# Patient Record
Sex: MALE | Race: WHITE | NOT HISPANIC OR LATINO | Employment: OTHER | URBAN - METROPOLITAN AREA
[De-identification: names, ages, dates, MRNs, and addresses within clinical notes are randomized per-mention and may not be internally consistent; named-entity substitution may affect disease eponyms.]

---

## 2017-02-03 ENCOUNTER — OFFICE VISIT (OUTPATIENT)
Dept: RHEUMATOLOGY | Facility: PHYSICIAN GROUP | Age: 72
End: 2017-02-03
Payer: MEDICARE

## 2017-02-03 VITALS
SYSTOLIC BLOOD PRESSURE: 142 MMHG | TEMPERATURE: 98.1 F | OXYGEN SATURATION: 94 % | RESPIRATION RATE: 12 BRPM | DIASTOLIC BLOOD PRESSURE: 80 MMHG | WEIGHT: 256 LBS | HEART RATE: 79 BPM | BODY MASS INDEX: 35.84 KG/M2

## 2017-02-03 DIAGNOSIS — M15.9 PRIMARY OSTEOARTHRITIS INVOLVING MULTIPLE JOINTS: ICD-10-CM

## 2017-02-03 DIAGNOSIS — G47.30 SLEEP APNEA, UNSPECIFIED TYPE: ICD-10-CM

## 2017-02-03 DIAGNOSIS — M1A.09X0 IDIOPATHIC CHRONIC GOUT OF MULTIPLE SITES WITHOUT TOPHUS: ICD-10-CM

## 2017-02-03 DIAGNOSIS — K51.90 ULCERATIVE COLITIS WITHOUT COMPLICATIONS, UNSPECIFIED LOCATION (HCC): ICD-10-CM

## 2017-02-03 PROCEDURE — G8419 CALC BMI OUT NRM PARAM NOF/U: HCPCS | Performed by: INTERNAL MEDICINE

## 2017-02-03 PROCEDURE — 4004F PT TOBACCO SCREEN RCVD TLK: CPT | Mod: 8P | Performed by: INTERNAL MEDICINE

## 2017-02-03 PROCEDURE — G8432 DEP SCR NOT DOC, RNG: HCPCS | Performed by: INTERNAL MEDICINE

## 2017-02-03 PROCEDURE — 4040F PNEUMOC VAC/ADMIN/RCVD: CPT | Mod: 8P | Performed by: INTERNAL MEDICINE

## 2017-02-03 PROCEDURE — 3017F COLORECTAL CA SCREEN DOC REV: CPT | Mod: 8P | Performed by: INTERNAL MEDICINE

## 2017-02-03 PROCEDURE — G8484 FLU IMMUNIZE NO ADMIN: HCPCS | Performed by: INTERNAL MEDICINE

## 2017-02-03 PROCEDURE — 99214 OFFICE O/P EST MOD 30 MIN: CPT | Mod: 25 | Performed by: INTERNAL MEDICINE

## 2017-02-03 PROCEDURE — 1101F PT FALLS ASSESS-DOCD LE1/YR: CPT | Mod: 8P | Performed by: INTERNAL MEDICINE

## 2017-02-03 PROCEDURE — 20600 DRAIN/INJ JOINT/BURSA W/O US: CPT | Performed by: INTERNAL MEDICINE

## 2017-02-03 RX ORDER — ALLOPURINOL 300 MG/1
TABLET ORAL
Qty: 90 TAB | Refills: 1 | Status: SHIPPED | OUTPATIENT
Start: 2017-02-03 | End: 2017-09-14

## 2017-02-03 RX ORDER — METHYLPREDNISOLONE ACETATE 40 MG/ML
40 INJECTION, SUSPENSION INTRA-ARTICULAR; INTRALESIONAL; INTRAMUSCULAR; SOFT TISSUE ONCE
Status: COMPLETED | OUTPATIENT
Start: 2017-02-03 | End: 2017-02-03

## 2017-02-03 RX ADMIN — METHYLPREDNISOLONE ACETATE 10 MG: 40 INJECTION, SUSPENSION INTRA-ARTICULAR; INTRALESIONAL; INTRAMUSCULAR; SOFT TISSUE at 08:46

## 2017-02-03 NOTE — PROGRESS NOTES
Chief Complaint- joint pain    Subjective:   hCristopher Ho is a 71 y.o. male here today for follow up of rheumatological issues    This is a [facility] visit [facility]  [facility] and DJD, djd started about 10 years, pt also with gout, last big flare 6 years ago got cortisone injections, patient hasn't had a gout flare since last visit, patient currently on allopurinol at 300 mg by mouth daily, and states he is generally quite well. Since last visit, patient has not had any gout flare but does complain of pain in his right MCP joint. In the remote past, we had done a right index MCP joint cortisone injection with good results, patient wonders about getting that again today. Pt also with DM, no thyroid problems  No skin conditions, no anemia, no DVT/PE, no iritis/uveitis, no chronic recurrent infections, n organ problems  Pt takes aleve qday  Pt with sleep apnea, on CPAP  Pt with ulcerative colitis, pt takes Apriso and states that the UC is under very good control, denies achilles tendon inflammation    Uric acid 6.0 8/2013 [facility]  Uric acid 8.4 elevated 3/2016  RF neg 3/2016  CCP neg 3/2016  SI joints 3/2016- normal   DEXA 4/2016 T scores 2.3, 0.1      Current medicines (including changes today)  Current Outpatient Prescriptions   Medication Sig Dispense Refill   • allopurinol (ZYLOPRIM) 300 MG Tab 1 tab po qday 90 Tab 1   • meloxicam (MOBIC) 15 MG tablet 1 tab po qday for joint pain 30 Tab 0   • atorvastatin (LIPITOR) 10 MG Tab Take 10 mg by mouth every evening.     • metformin (GLUCOPHAGE) 500 MG Tab Take 500 mg by mouth 2 times a day, with meals.     • Mesalamine 0.375 GM CAPSULE SR 24 HR Take  by mouth.     • zolpidem (AMBIEN) 5 MG Tab Take 5 mg by mouth at bedtime as needed for Sleep.     • Indomethacin (INDOCIN PO) Take  by mouth.       No current facility-administered medications for this visit.     He  has no past medical history on file.    ROS   Other than what is mentioned in HPI or  physical exam, there is no history of headaches, double vision or blurred vision. No temporal tenderness or jaw claudication. No history of cataracts or glaucoma. No trouble swallowing difficulties or sore throats. No history of thyroid disease. No chest complaints including chest pain, cough or sputum production. No shortness of breath. No GI complaints including nausea, vomiting, change in bowel habits, or past peptic ulcer disease. No history of blood in the stools. No urinary complaints including dysuria or frequency. No history of rash including psoriasis. No history of alopecia, photosensitivity, oral ulcerations, Raynaud's phenomena, or swollen joints. No history of gout. No back complaints. No history of low blood counts.       Objective:     Blood pressure 142/80, pulse 79, temperature 36.7 °C (98.1 °F), resp. rate 12, weight 116.121 kg (256 lb), SpO2 94 %. Body mass index is 35.84 kg/(m^2).   Physical Exam:  Constitutional: Alert and oriented X3, no distress.Skin: Warm, dry, good turgor, no rashes in visible areas, no evidence of psoriatic plaques, no discoid lesions, no malar rashes.Eye: Equal, round and reactive, conjunctiva clear, lids normal EOM intactENMT: Lips without lesions, good dentition, no oropharyngeal ulcers, moist buccal mucosa, pinna without deformityNeck: Trachea midline, no masses, no thyromegaly.Lymph:  No cervical lymphadenopathy, no axillary lymphadenopathy, no supraclavicular lymphadenopathyRespiratory: Unlabored respiratory effort, lungs clear to auscultation, no wheezes, no ronchi.Cardiovascular: Normal S1, S2, no murmur, no edema.Abdomen: Soft, non-tender, no masses, no hepatosplenomegaly, there is some mild central obesityPsych: Alert and oriented x3, normal affect and mood.Neuro Cranial nerves 2-12 are grossly intact, no loss of sensation LEExt:no joint laxity noted in bilateral arms, no joint laxity noted in bilateral legs, no swan-neck or boutonniere deformities, no sausage  digits, Heberden's node noted on the left index DIP joint, no ulnar deviation, no ulnar styloid process enlargement, no flexure contractures, no nodules no tophi, shoulders full range of motion, knees with crepitus but without effusions, no Achilles tendon inflammation, there is mild pes planus bilateral feet, no evidence of sausage digits noted neither on fingers and her toes, no sclerodactyly,no josselyn ungal erythema, mild tenderness to palpation along the right index finger MCP joint, no effusions, no redness, no warmth,    Lab Results   Component Value Date/Time    SODIUM 139 11/21/2016 03:21 PM    POTASSIUM 4.2 11/21/2016 03:21 PM    CHLORIDE 106 11/21/2016 03:21 PM    CO2 27 11/21/2016 03:21 PM    GLUCOSE 103* 11/21/2016 03:21 PM    BUN 19 11/21/2016 03:21 PM    CREATININE 0.94 11/21/2016 03:21 PM      Lab Results   Component Value Date/Time    WBC 5.3 11/21/2016 03:21 PM    RBC 4.47* 11/21/2016 03:21 PM    HEMOGLOBIN 15.1 11/21/2016 03:21 PM    HEMATOCRIT 43.5 11/21/2016 03:21 PM    MCV 97.3 11/21/2016 03:21 PM    MCH 33.8* 11/21/2016 03:21 PM    MCHC 34.7 11/21/2016 03:21 PM    MPV 9.8 11/21/2016 03:21 PM    NEUTROPHILS-POLYS 57.70 11/21/2016 03:21 PM    LYMPHOCYTES 32.30 11/21/2016 03:21 PM    MONOCYTES 7.30 11/21/2016 03:21 PM    EOSINOPHILS 1.90 11/21/2016 03:21 PM    BASOPHILS 0.60 11/21/2016 03:21 PM      Lab Results   Component Value Date/Time    CALCIUM 10.0 11/21/2016 03:21 PM    AST(SGOT) 14 11/21/2016 03:21 PM    ALT(SGPT) 16 11/21/2016 03:21 PM    ALKALINE PHOSPHATASE 48 11/21/2016 03:21 PM    TOTAL BILIRUBIN 0.6 11/21/2016 03:21 PM    ALBUMIN 4.2 11/21/2016 03:21 PM    TOTAL PROTEIN 6.7 11/21/2016 03:21 PM     Lab Results   Component Value Date/Time    URIC ACID 8.4* 03/16/2016 08:34 AM    RHEUMATOID FACTOR -NEPH- <10 03/16/2016 08:34 AM    CCP ANTIBODIES 3 03/16/2016 08:34 AM     Lab Results   Component Value Date/Time    SED RATE HALERGREN 2 11/21/2016 03:21 PM     Results for orders placed  during the hospital encounter of 04/01/16   DS-BONE DENSITY STUDY (DEXA)    Impression According to the World Health Organization classification, bone mineral density of this patient is normal.        10-year Probability of Fracture:  Major Osteoporotic     4.1%  Hip     0.3%  Population      [place] ()    Based on left femur neck BMD          INTERPRETING LOCATION:  [address] [place] [address], [place], [address]     Results for orders placed during the hospital encounter of 03/16/16   DX-SACROILIAC JOINTS 3+    Impression Negative exam of the sacroiliac joints.      A/P   1. Idiopathic chronic gout of multiple sites without tophus  Clinically doing really quite well with allopurinol at 300 mg by mouth daily, will continue such, patient to do labs every 6 months while on allopurinol i.e. CBC and basic metabolic panel,  Next labs we will also check uric acid level  - allopurinol (ZYLOPRIM) 300 MG Tab; 1 tab po qday  Dispense: 90 Tab; Refill: 1  - methylPREDNISolone acetate (DEPO-MEDROL) injection 40 mg; 1 mL by Intra-articular route Once.    2. Ulcerative colitis without complications, unspecified location (CMS-[facility])  [facility]  - methylPREDNISolone acetate (DEPO-MEDROL) injection 40 mg; 1 mL by Intra-articular route Once.    3. Primary osteoarthritis involving multiple joints  With manifestations of pain in right knuckle on the second MCP joint, today will do cortisone shot today  - allopurinol (ZYLOPRIM) 300 MG Tab; 1 tab po qday  Dispense: 90 Tab; Refill: 1  - methylPREDNISolone acetate (DEPO-MEDROL) injection 40 mg; 1 mL by Intra-articular route Once.    4. Sleep apnea, unspecified type  Patient has a CPAP machine at home which he does not use because it bothers him when he sleeping.  - allopurinol (ZYLOPRIM) 300 MG Tab; 1 tab po qday  Dispense: 90 Tab; Refill: 1    Followup: Return in about 4 months (around 6/3/2017). or sooner prn    Patient was seen 30 minutes face-to-face of  which more than 50% of the time was spent counseling the patient (excluding time for procedures)  regarding  rheumatological condition and care. Therapy was discussed in detail.    Please note that this dictation was created using voice recognition software. I have made every reasonable attempt to correct obvious errors, but I expect that there are errors of grammar and possibly content that I did not discover before finalizing the note.    Procedure: Right second MCP joint cortisone injection    The procedure was explained to the patient in detail, all questions were answered. Risks and benefits were reviewed with patient and patient states understanding including risks of steroid injections including bleeding, infections, subcutaneous lipolysis and/or hypopigmentation at site of injection, and risk of avascular necrosis. Consent was obtained. The patient was positioned appropriately. Anatomical landmarks were identified.    Right second MCP joint dorsal aspect was prepped with betadine x 3, local anesthetic with ethyl chloride and 1% Xylocaine lot #[id number], Exp March 2020 and using sterile technique,  injected 10 mg of Depomedrol Lot #[id number], Exp October 2017    EBL 0  The patient tolerated the procedure well, was observed in the office and there were no complications     Patient was asked to rest right index finger for 3 days, patient states understanding and states will comply.  Approximately 30 minutes time was spent face to face with patient of which at least 50% of time was reviewing risk and benefits of procedure and the procedure in detail.

## 2017-02-03 NOTE — MR AVS SNAPSHOT
Christopher Ho   2/3/2017 8:15 AM   Office Visit   MRN: [id number]    Department:  Rheumatology Med [facility]   Dept Phone:  [phone]    Description:  Male : 1945   Provider:  Sabrina Pino M.D.           Reason for Visit     Follow-Up           Allergies as of 2/3/2017     No Known Allergies      You were diagnosed with     Idiopathic chronic gout of multiple sites without tophus   [476196]       Primary osteoarthritis involving multiple joints   [6276757]       Sleep apnea, unspecified type   [8102947]         Vital Signs     Blood Pressure Pulse Temperature Respirations Weight Oxygen Saturation    142/80 mmHg 79 36.7 °C (98.1 °F) 12 116.121 kg (256 lb) 94%      Basic Information     Date Of Birth Sex Race Ethnicity Preferred Language    1945 Male White Non- English      Your appointments     2017  8:15 AM   Follow Up Visit with Sabrina Pino M.D.   [facility] (--)    [address] [place] [address], [address]  [facility] [address] [phone]           You will be receiving a confirmation call a few days before your appointment from our automated call confirmation system.              Problem List              ICD-10-CM Priority Class Noted - Resolved    Gout M10.9   3/8/2016 - Present    Ulcerative colitis (CMS-HCC) K51.90   3/8/2016 - Present    Primary osteoarthritis involving multiple joints M15.0   3/8/2016 - Present    Sleep apnea G47.30   3/8/2016 - Present      Health Maintenance        Date Due Completion Dates    IMM DTaP/Tdap/Td Vaccine (1 - Tdap) 4/10/1966 ---    COLONOSCOPY 4/10/1997 ---    IMM ZOSTER VACCINE 4/10/2007 ---    IMM PNEUMOCOCCAL 65+ (ADULT) LOW/MEDIUM RISK SERIES (1 of 2 - PCV13) 4/10/2012 ---    IMM INFLUENZA (1) 2016 ---            Current Immunizations     No immunizations on file.      Below and/or attached are the medications your provider expects you to take. Review all of your home medications and newly ordered medications with  your provider and/or pharmacist. Follow medication instructions as directed by your provider and/or pharmacist. Please keep your medication list with you and share with your provider. Update the information when medications are discontinued, doses are changed, or new medications (including over-the-counter products) are added; and carry medication information at all times in the event of emergency situations     Allergies:  No Known Allergies          Medications  Valid as of: February 03, 2017 -  8:42 AM    Generic Name Brand Name Tablet Size Instructions for use    Allopurinol (Tab) ZYLOPRIM 300 MG 1 tab po qday        Atorvastatin Calcium (Tab) LIPITOR 10 MG Take 10 mg by mouth every evening.        Indomethacin   Take  by mouth.        Meloxicam (Tab) MOBIC 15 MG 1 tab po qday for joint pain        Mesalamine (CAPSULE SR 24 HR) Mesalamine 0.375 GM Take  by mouth.        MetFORMIN HCl (Tab) GLUCOPHAGE 500 MG Take 500 mg by mouth 2 times a day, with meals.        Zolpidem Tartrate (Tab) AMBIEN 5 MG Take 5 mg by mouth at bedtime as needed for Sleep.        .                 Medicines prescribed today were sent to:     None      Medication refill instructions:       If your prescription bottle indicates you have medication refills left, it is not necessary to call your provider’s office. Please contact your pharmacy and they will refill your medication.    If your prescription bottle indicates you do not have any refills left, you may request refills at any time through one of the following ways: The online [employer] system (except Urgent Care), by calling your provider’s office, or by asking your pharmacy to contact your provider’s office with a refill request. Medication refills are processed only during regular business hours and may not be available until the next business day. Your provider may request additional information or to have a follow-up visit with you prior to refilling your medication.   *Please Note:  Medication refills are assigned a new Rx number when refilled electronically. Your pharmacy may indicate that no refills were authorized even though a new prescription for the same medication is available at the pharmacy. Please request the medicine by name with the pharmacy before contacting your provider for a refill.           [employer] Access Code: Activation code not generated  Current [employer] Status: Active

## 2017-02-03 NOTE — Clinical Note
[facility]   [address], [address]  [place] [address]  Phone: [phone]  Fax: [phone]              Encounter Date: 2/3/2017    Dear Dr. De La Rosa ref. provider found,    It was a pleasure seeing your patient, Christopher Ho, on 2/3/2017. Diagnoses of Idiopathic chronic gout of multiple sites without tophus, Ulcerative colitis without complications, unspecified location (CMS-[facility]), Primary osteoarthritis involving multiple joints, and Sleep apnea, unspecified type were pertinent to this visit.     Please find attached progress note which includes the history I obtained from Mr. Ho, my physical examination findings, my impression and recommendations.      Once again, it was a pleasure participating in your patient's care.  Please feel free to contact me if you have any questions or if I can be of any further assistance to your patients.      Sincerely,    Sabrina Pino M.D.  Electronically Signed          PROGRESS NOTE:  No notes on file

## 2017-05-22 ENCOUNTER — HOSPITAL ENCOUNTER (OUTPATIENT)
Dept: LAB | Facility: MEDICAL CENTER | Age: 72
End: 2017-05-22
Attending: FAMILY MEDICINE
Payer: MEDICARE

## 2017-05-22 LAB
25(OH)D3 SERPL-MCNC: 29 NG/ML (ref 30–100)
ALBUMIN SERPL BCP-MCNC: 3.9 G/DL (ref 3.2–4.9)
ALBUMIN/GLOB SERPL: 1.3 G/DL
ALP SERPL-CCNC: 50 U/L (ref 30–99)
ALT SERPL-CCNC: 15 U/L (ref 2–50)
ANION GAP SERPL CALC-SCNC: 7 MMOL/L (ref 0–11.9)
APPEARANCE UR: CLEAR
AST SERPL-CCNC: 14 U/L (ref 12–45)
BASOPHILS # BLD AUTO: 0.5 % (ref 0–1.8)
BASOPHILS # BLD: 0.02 K/UL (ref 0–0.12)
BILIRUB SERPL-MCNC: 0.9 MG/DL (ref 0.1–1.5)
BILIRUB UR QL STRIP.AUTO: NEGATIVE
BUN SERPL-MCNC: 16 MG/DL (ref 8–22)
CALCIUM SERPL-MCNC: 9.3 MG/DL (ref 8.5–10.5)
CHLORIDE SERPL-SCNC: 105 MMOL/L (ref 96–112)
CHOLEST SERPL-MCNC: 198 MG/DL (ref 100–199)
CO2 SERPL-SCNC: 25 MMOL/L (ref 20–33)
COLOR UR: YELLOW
CREAT SERPL-MCNC: 0.96 MG/DL (ref 0.5–1.4)
CREAT UR-MCNC: 105.1 MG/DL
CULTURE IF INDICATED INDCX: NO UA CULTURE
EOSINOPHIL # BLD AUTO: 0.07 K/UL (ref 0–0.51)
EOSINOPHIL NFR BLD: 1.7 % (ref 0–6.9)
ERYTHROCYTE [DISTWIDTH] IN BLOOD BY AUTOMATED COUNT: 45 FL (ref 35.9–50)
EST. AVERAGE GLUCOSE BLD GHB EST-MCNC: 123 MG/DL
GFR SERPL CREATININE-BSD FRML MDRD: >60 ML/MIN/1.73 M 2
GLOBULIN SER CALC-MCNC: 2.9 G/DL (ref 1.9–3.5)
GLUCOSE SERPL-MCNC: 128 MG/DL (ref 65–99)
GLUCOSE UR STRIP.AUTO-MCNC: NEGATIVE MG/DL
HBA1C MFR BLD: 5.9 % (ref 0–5.6)
HCT VFR BLD AUTO: 43.4 % (ref 42–52)
HDLC SERPL-MCNC: 44 MG/DL
HGB BLD-MCNC: 15.1 G/DL (ref 14–18)
IMM GRANULOCYTES # BLD AUTO: 0.01 K/UL (ref 0–0.11)
IMM GRANULOCYTES NFR BLD AUTO: 0.2 % (ref 0–0.9)
KETONES UR STRIP.AUTO-MCNC: NEGATIVE MG/DL
LDLC SERPL CALC-MCNC: 118 MG/DL
LEUKOCYTE ESTERASE UR QL STRIP.AUTO: NEGATIVE
LYMPHOCYTES # BLD AUTO: 1.3 K/UL (ref 1–4.8)
LYMPHOCYTES NFR BLD: 31.6 % (ref 22–41)
MCH RBC QN AUTO: 33.7 PG (ref 27–33)
MCHC RBC AUTO-ENTMCNC: 34.8 G/DL (ref 33.7–35.3)
MCV RBC AUTO: 96.9 FL (ref 81.4–97.8)
MICRO URNS: ABNORMAL
MICROALBUMIN UR-MCNC: 3.7 MG/DL
MICROALBUMIN/CREAT UR: 35 MG/G (ref 0–30)
MONOCYTES # BLD AUTO: 0.33 K/UL (ref 0–0.85)
MONOCYTES NFR BLD AUTO: 8 % (ref 0–13.4)
MUCOUS THREADS #/AREA URNS HPF: ABNORMAL /HPF
NEUTROPHILS # BLD AUTO: 2.39 K/UL (ref 1.82–7.42)
NEUTROPHILS NFR BLD: 58 % (ref 44–72)
NITRITE UR QL STRIP.AUTO: NEGATIVE
NRBC # BLD AUTO: 0 K/UL
NRBC BLD AUTO-RTO: 0 /100 WBC
PH UR STRIP.AUTO: 6.5 [PH]
PLATELET # BLD AUTO: 170 K/UL (ref 164–446)
PMV BLD AUTO: 9.8 FL (ref 9–12.9)
POTASSIUM SERPL-SCNC: 3.9 MMOL/L (ref 3.6–5.5)
PROT SERPL-MCNC: 6.8 G/DL (ref 6–8.2)
PROT UR QL STRIP: 30 MG/DL
PSA SERPL-MCNC: 3.36 NG/ML (ref 0–4)
RBC # BLD AUTO: 4.48 M/UL (ref 4.7–6.1)
RBC # URNS HPF: ABNORMAL /HPF
RBC UR QL AUTO: NEGATIVE
SODIUM SERPL-SCNC: 137 MMOL/L (ref 135–145)
SP GR UR STRIP.AUTO: 1.02
TRIGL SERPL-MCNC: 178 MG/DL (ref 0–149)
URATE SERPL-MCNC: 6.8 MG/DL (ref 2.5–8.3)
WBC # BLD AUTO: 4.1 K/UL (ref 4.8–10.8)
WBC #/AREA URNS HPF: ABNORMAL /HPF

## 2017-05-22 PROCEDURE — 84550 ASSAY OF BLOOD/URIC ACID: CPT

## 2017-05-22 PROCEDURE — 80061 LIPID PANEL: CPT

## 2017-05-22 PROCEDURE — 85025 COMPLETE CBC W/AUTO DIFF WBC: CPT

## 2017-05-22 PROCEDURE — 81001 URINALYSIS AUTO W/SCOPE: CPT

## 2017-05-22 PROCEDURE — 82306 VITAMIN D 25 HYDROXY: CPT

## 2017-05-22 PROCEDURE — 83036 HEMOGLOBIN GLYCOSYLATED A1C: CPT

## 2017-05-22 PROCEDURE — 82043 UR ALBUMIN QUANTITATIVE: CPT

## 2017-05-22 PROCEDURE — 84153 ASSAY OF PSA TOTAL: CPT

## 2017-05-22 PROCEDURE — 82570 ASSAY OF URINE CREATININE: CPT

## 2017-05-22 PROCEDURE — 80053 COMPREHEN METABOLIC PANEL: CPT

## 2017-05-22 PROCEDURE — 36415 COLL VENOUS BLD VENIPUNCTURE: CPT

## 2017-05-25 ENCOUNTER — OFFICE VISIT (OUTPATIENT)
Dept: RHEUMATOLOGY | Facility: PHYSICIAN GROUP | Age: 72
End: 2017-05-25
Payer: MEDICARE

## 2017-05-25 VITALS
HEART RATE: 86 BPM | BODY MASS INDEX: 35.7 KG/M2 | WEIGHT: 255 LBS | DIASTOLIC BLOOD PRESSURE: 80 MMHG | RESPIRATION RATE: 16 BRPM | OXYGEN SATURATION: 93 % | SYSTOLIC BLOOD PRESSURE: 150 MMHG | TEMPERATURE: 98.2 F

## 2017-05-25 DIAGNOSIS — G89.29 CHRONIC PAIN OF RIGHT KNEE: ICD-10-CM

## 2017-05-25 DIAGNOSIS — R25.2 SPASM: ICD-10-CM

## 2017-05-25 DIAGNOSIS — M1A.09X0 IDIOPATHIC CHRONIC GOUT OF MULTIPLE SITES WITHOUT TOPHUS: ICD-10-CM

## 2017-05-25 DIAGNOSIS — G47.30 SLEEP APNEA, UNSPECIFIED TYPE: ICD-10-CM

## 2017-05-25 DIAGNOSIS — K51.90 ULCERATIVE COLITIS WITHOUT COMPLICATIONS, UNSPECIFIED LOCATION (HCC): ICD-10-CM

## 2017-05-25 DIAGNOSIS — M15.9 PRIMARY OSTEOARTHRITIS INVOLVING MULTIPLE JOINTS: ICD-10-CM

## 2017-05-25 DIAGNOSIS — M25.561 CHRONIC PAIN OF RIGHT KNEE: ICD-10-CM

## 2017-05-25 PROCEDURE — 4004F PT TOBACCO SCREEN RCVD TLK: CPT | Mod: 8P | Performed by: INTERNAL MEDICINE

## 2017-05-25 PROCEDURE — 4040F PNEUMOC VAC/ADMIN/RCVD: CPT | Mod: 8P | Performed by: INTERNAL MEDICINE

## 2017-05-25 PROCEDURE — G8432 DEP SCR NOT DOC, RNG: HCPCS | Performed by: INTERNAL MEDICINE

## 2017-05-25 PROCEDURE — 1101F PT FALLS ASSESS-DOCD LE1/YR: CPT | Mod: 8P | Performed by: INTERNAL MEDICINE

## 2017-05-25 PROCEDURE — G8417 CALC BMI ABV UP PARAM F/U: HCPCS | Performed by: INTERNAL MEDICINE

## 2017-05-25 PROCEDURE — 20610 DRAIN/INJ JOINT/BURSA W/O US: CPT | Performed by: INTERNAL MEDICINE

## 2017-05-25 PROCEDURE — 99214 OFFICE O/P EST MOD 30 MIN: CPT | Mod: 25 | Performed by: INTERNAL MEDICINE

## 2017-05-25 PROCEDURE — 3017F COLORECTAL CA SCREEN DOC REV: CPT | Performed by: INTERNAL MEDICINE

## 2017-05-25 RX ORDER — TIZANIDINE HYDROCHLORIDE 2 MG/1
CAPSULE, GELATIN COATED ORAL
Qty: 60 CAP | Refills: 0 | Status: ON HOLD | OUTPATIENT
Start: 2017-05-25 | End: 2017-09-28

## 2017-05-25 RX ORDER — METHYLPREDNISOLONE ACETATE 40 MG/ML
40 INJECTION, SUSPENSION INTRA-ARTICULAR; INTRALESIONAL; INTRAMUSCULAR; SOFT TISSUE ONCE
Status: COMPLETED | OUTPATIENT
Start: 2017-05-25 | End: 2017-05-25

## 2017-05-25 RX ADMIN — METHYLPREDNISOLONE ACETATE 30 MG: 40 INJECTION, SUSPENSION INTRA-ARTICULAR; INTRALESIONAL; INTRAMUSCULAR; SOFT TISSUE at 14:27

## 2017-05-25 NOTE — Clinical Note
[facility]   [address], [address]  [place] [address]  Phone: [phone]  Fax: [phone]              Encounter Date: 5/25/2017    Dear Dr. De La Rosa ref. provider found,    It was a pleasure seeing your patient, Christopher Ho, on 5/25/2017. Diagnoses of Idiopathic chronic gout of multiple sites without tophus, Chronic pain of right knee, Spasm, Primary osteoarthritis involving multiple joints, Ulcerative colitis without complications, unspecified location (CMS-[facility]), and Sleep apnea, unspecified type were pertinent to this visit.     Please find attached progress note which includes the history I obtained from Mr. Ho, my physical examination findings, my impression and recommendations.      Once again, it was a pleasure participating in your patient's care.  Please feel free to contact me if you have any questions or if I can be of any further assistance to your patients.      Sincerely,    Sabrina Pino M.D.  Electronically Signed          PROGRESS NOTE:  No notes on file

## 2017-05-25 NOTE — MR AVS SNAPSHOT
Christopher Ho   2017 2:00 PM   Office Visit   MRN: [id number]    Department:  Rheumatology Med [facility]   Dept Phone:  [phone]    Description:  Male : 1945   Provider:  Sabrina Pino M.D.           Reason for Visit     Follow-Up           Allergies as of 2017     No Known Allergies      You were diagnosed with     Idiopathic chronic gout of multiple sites without tophus   [165073]       Chronic pain of right knee   [0122791]       Spasm   [161152]       Primary osteoarthritis involving multiple joints   [4503673]       Ulcerative colitis without complications, unspecified location (CMS-HCC)   [1730657]       Sleep apnea, unspecified type   [8385577]         Vital Signs     Blood Pressure Pulse Temperature Respirations Weight Oxygen Saturation    150/80 mmHg 86 36.8 °C (98.2 °F) 16 115.667 kg (255 lb) 93%      Basic Information     Date Of Birth Sex Race Ethnicity Preferred Language    1945 Male White Non- English      Your appointments     Sep 27, 2017  9:45 AM   Follow Up Visit with Sabrina Pino M.D.   [facility] (--)    [address], [address]  [facility] [id number] [phone]           You will be receiving a confirmation call a few days before your appointment from our automated call confirmation system.              Problem List              ICD-10-CM Priority Class Noted - Resolved    Gout M10.9   3/8/2016 - Present    Ulcerative colitis (CMS-HCC) K51.90   3/8/2016 - Present    Primary osteoarthritis involving multiple joints M15.0   3/8/2016 - Present    Sleep apnea G47.30   3/8/2016 - Present      Health Maintenance        Date Due Completion Dates    IMM DTaP/Tdap/Td Vaccine (1 - Tdap) 4/10/1966 ---    COLONOSCOPY 4/10/1997 ---    IMM ZOSTER VACCINE 4/10/2007 ---    IMM PNEUMOCOCCAL 65+ (ADULT) LOW/MEDIUM RISK SERIES (1 of 2 - PCV13) 4/10/2012 ---            Current Immunizations     No immunizations on file.      Below and/or  attached are the medications your provider expects you to take. Review all of your home medications and newly ordered medications with your provider and/or pharmacist. Follow medication instructions as directed by your provider and/or pharmacist. Please keep your medication list with you and share with your provider. Update the information when medications are discontinued, doses are changed, or new medications (including over-the-counter products) are added; and carry medication information at all times in the event of emergency situations     Allergies:  No Known Allergies          Medications  Valid as of: May 25, 2017 -  2:28 PM    Generic Name Brand Name Tablet Size Instructions for use    Allopurinol (Tab) ZYLOPRIM 300 MG 1 tab po qday        Atorvastatin Calcium (Tab) LIPITOR 10 MG Take 10 mg by mouth every evening.        Indomethacin   Take  by mouth.        Meloxicam (Tab) MOBIC 15 MG 1 tab po qday for joint pain        Mesalamine (CAPSULE SR 24 HR) Mesalamine 0.375 GM Take  by mouth.        MetFORMIN HCl (Tab) GLUCOPHAGE 500 MG Take 500 mg by mouth 2 times a day, with meals.        TiZANidine HCl (Cap) ZANAFLEX 2 MG 1-2 tabs po qhs for back spasm        Zolpidem Tartrate (Tab) AMBIEN 5 MG Take 5 mg by mouth at bedtime as needed for Sleep.        .                 Medicines prescribed today were sent to:     [facility] [id number] [place] [address] [address]    Phone: [phone] Fax: [phone]    Open 24 Hours?: No      Medication refill instructions:       If your prescription bottle indicates you have medication refills left, it is not necessary to call your provider’s office. Please contact your pharmacy and they will refill your medication.    If your prescription bottle indicates you do not have any refills left, you may request refills at any time through one of the following ways: The online [employer] system (except Urgent Care), by calling your  provider’s office, or by asking your pharmacy to contact your provider’s office with a refill request. Medication refills are processed only during regular business hours and may not be available until the next business day. Your provider may request additional information or to have a follow-up visit with you prior to refilling your medication.   *Please Note: Medication refills are assigned a new Rx number when refilled electronically. Your pharmacy may indicate that no refills were authorized even though a new prescription for the same medication is available at the pharmacy. Please request the medicine by name with the pharmacy before contacting your provider for a refill.        Your To Do List     Future Labs/Procedures Complete By Expires    DX-KNEES-AP BILATERAL STANDING  As directed 5/26/2018    MR-KNEE-W/O RIGHT  As directed 5/25/2018      Referral     A referral request has been sent to our patient care coordination department. Please allow 3-5 business days for us to process this request and contact you either by phone or mail. If you do not hear from us by the 5th business day, please call us at [phone].           [employer] Access Code: Activation code not generated  Current [employer] Status: Active

## 2017-05-25 NOTE — PROGRESS NOTES
Chief Complaint- joint pain    Subjective:   Christopher Ho is a 72 y.o. male here today for follow up of rheumatological issues    This is a follow[facility] visit [facility]  [facility] and DJD, djd started about 10 years, pt also with gout, last big flare 6 years ago got cortisone injections, patient hasn't had a gout flare since last visit, patient currently on allopurinol at 300 mg by mouth daily, and states he is generally quite well. Patient here today complaining of right knee pain, states that he was working in the yard and he felt his right knee giveaway with pain and wonders about getting a cortisone shot in the right knee. Pt also with DM, no thyroid problems  No skin conditions, no anemia, no DVT/PE, no iritis/uveitis, no chronic recurrent infections, n organ problems  Pt takes aleve qday  Pt with sleep apnea, on CPAP  Pt with ulcerative colitis, pt takes Apriso and states that the UC is under very good control, denies achilles tendon inflammation    Uric acid 6.0 8/2013 [facility]; Uric acid 8.4 elevated 3/2016; Uric acid 6.8 5/2017  RF neg 3/2016  CCP neg 3/2016  SI joints 3/2016- normal   DEXA 4/2016 T scores 2.3, 0.1      Current medicines (including changes today)  Current Outpatient Prescriptions   Medication Sig Dispense Refill   • tizanidine (ZANAFLEX) 2 MG capsule 1-2 tabs po qhs for back spasm 60 Cap 0   • allopurinol (ZYLOPRIM) 300 MG Tab 1 tab po qday 90 Tab 1   • meloxicam (MOBIC) 15 MG tablet 1 tab po qday for joint pain 30 Tab 0   • atorvastatin (LIPITOR) 10 MG Tab Take 10 mg by mouth every evening.     • metformin (GLUCOPHAGE) 500 MG Tab Take 500 mg by mouth 2 times a day, with meals.     • Mesalamine 0.375 GM CAPSULE SR 24 HR Take  by mouth.     • zolpidem (AMBIEN) 5 MG Tab Take 5 mg by mouth at bedtime as needed for Sleep.     • Indomethacin (INDOCIN PO) Take  by mouth.       No current facility-administered medications for this visit.     He  has no past medical history on  file.    ROS   Other than what is mentioned in HPI or physical exam, there is no history of headaches, double vision or blurred vision. No temporal tenderness or jaw claudication. No history of cataracts or glaucoma. No trouble swallowing difficulties or sore throats. No history of thyroid disease. No chest complaints including chest pain, cough or sputum production. No shortness of breath. No GI complaints including nausea, vomiting, change in bowel habits, or past peptic ulcer disease. No history of blood in the stools. No urinary complaints including dysuria or frequency. No history of rash including psoriasis. No history of alopecia, photosensitivity, oral ulcerations, Raynaud's phenomena, or swollen joints. No history of gout. No back complaints. No history of low blood counts.       Objective:     Blood pressure 150/80, pulse 86, temperature 36.8 °C (98.2 °F), resp. rate 16, weight 115.667 kg (255 lb), SpO2 93 %. Body mass index is 35.7 kg/(m^2).   Physical Exam:  Constitutional: Alert and oriented X3, no distress.Skin: Warm, dry, good turgor, no rashes in visible areas, no evidence of psoriatic plaques, no discoid lesions, no malar rashes.Eye: Equal, round and reactive, conjunctiva clear, lids normal EOM intactENMT: Lips without lesions, good dentition, no oropharyngeal ulcers, moist buccal mucosa, pinna without deformityNeck: Trachea midline, no masses, no thyromegaly.Lymph:  No cervical lymphadenopathy, no axillary lymphadenopathy, no supraclavicular lymphadenopathyRespiratory: Unlabored respiratory effort, lungs clear to auscultation, no wheezes, no ronchi.Cardiovascular: Normal S1, S2, no murmur, no edema.Abdomen: Soft, non-tender, no masses, no hepatosplenomegaly, there is some mild central obesityPsych: Alert and oriented x3, normal affect and mood.Neuro Cranial nerves 2-12 are grossly intact, no loss of sensation LEExt:no joint laxity noted in bilateral arms, no joint laxity noted in bilateral legs, no  swan-neck or boutonniere deformities, no sausage digits, Heberden's node noted on the left index DIP joint, no ulnar deviation, no ulnar styloid process enlargement, no flexure contractures, no nodules no tophi, shoulders full range of motion, knees with crepitus but without effusions, no Achilles tendon inflammation, there is mild pes planus bilateral feet, no evidence of sausage digits noted neither on fingers and her toes, no sclerodactyly,no josselyn ungal erythema, mild tenderness to palpation along the right index finger MCP joint, no effusions, no redness, no warmth, there is some mild tenderness palpation along the right knee joint line but no effusions, no erythema, no lymphangitis  Lab Results   Component Value Date/Time    SODIUM 137 05/22/2017 09:04 AM    POTASSIUM 3.9 05/22/2017 09:04 AM    CHLORIDE 105 05/22/2017 09:04 AM    CO2 25 05/22/2017 09:04 AM    GLUCOSE 128* 05/22/2017 09:04 AM    BUN 16 05/22/2017 09:04 AM    CREATININE 0.96 05/22/2017 09:04 AM      Lab Results   Component Value Date/Time    WBC 4.1* 05/22/2017 09:04 AM    RBC 4.48* 05/22/2017 09:04 AM    HEMOGLOBIN 15.1 05/22/2017 09:04 AM    HEMATOCRIT 43.4 05/22/2017 09:04 AM    MCV 96.9 05/22/2017 09:04 AM    MCH 33.7* 05/22/2017 09:04 AM    MCHC 34.8 05/22/2017 09:04 AM    MPV 9.8 05/22/2017 09:04 AM    NEUTROPHILS-POLYS 58.00 05/22/2017 09:04 AM    LYMPHOCYTES 31.60 05/22/2017 09:04 AM    MONOCYTES 8.00 05/22/2017 09:04 AM    EOSINOPHILS 1.70 05/22/2017 09:04 AM    BASOPHILS 0.50 05/22/2017 09:04 AM      Lab Results   Component Value Date/Time    CALCIUM 9.3 05/22/2017 09:04 AM    AST(SGOT) 14 05/22/2017 09:04 AM    ALT(SGPT) 15 05/22/2017 09:04 AM    ALKALINE PHOSPHATASE 50 05/22/2017 09:04 AM    TOTAL BILIRUBIN 0.9 05/22/2017 09:04 AM    ALBUMIN 3.9 05/22/2017 09:04 AM    TOTAL PROTEIN 6.8 05/22/2017 09:04 AM     Lab Results   Component Value Date/Time    URIC ACID 6.8 05/22/2017 09:04 AM    RHEUMATOID FACTOR -NEPH- <10 03/16/2016 08:34  AM    CCP ANTIBODIES 3 03/16/2016 08:34 AM     Lab Results   Component Value Date/Time    SED RATE HALERGREN 2 11/21/2016 03:21 PM     Lab Results   Component Value Date/Time    GLYCOHEMOGLOBIN 5.9* 05/22/2017 09:04 AM     Results for orders placed during the hospital encounter of 04/01/16   DS-BONE DENSITY STUDY (DEXA)    Impression According to the World Health Organization classification, bone mineral density of this patient is normal.        10-year Probability of Fracture:  Major Osteoporotic     4.1%  Hip     0.3%  Population      [place] ()    Based on left femur neck BMD          INTERPRETING LOCATION:  [address] [place] [address], [place], [address]     Results for orders placed during the hospital encounter of 03/16/16   DX-SACROILIAC JOINTS 3+    Impression Negative exam of the sacroiliac joints.     Assessment and Plan:     1. Idiopathic chronic gout of multiple sites without tophus  Stable  Continue allopurinol 300 mg po qday  Patient should have uric acid, creatinine and liver functions checked every 6 months.  - DX-KNEES-AP BILATERAL STANDING; Future  - MR-KNEE-W/O RIGHT; Future  - REFERRAL TO ORTHOPEDICS    2. Chronic pain of right knee  Status post trauma, today we'll do a right knee cortisone injection but will also do x-rays for evaluation of any DJD as well as MRI for evaluation of meniscal tear and potential referral to orthopedics if there is a tear, patient states in the past he's been told he is bone-on-bone in that right knee.  - DX-KNEES-AP BILATERAL STANDING; Future  - MR-KNEE-W/O RIGHT; Future  - REFERRAL TO ORTHOPEDICS  - methylPREDNISolone acetate (DEPO-MEDROL) injection 40 mg; 1 mL by Intra-articular route Once.    3. Spasm  Of back, do a trial of tizanidine 2-4 mg by mouth daily at bedtime when necessary  - DX-KNEES-AP BILATERAL STANDING; Future  - MR-KNEE-W/O RIGHT; Future  - REFERRAL TO ORTHOPEDICS  - tizanidine (ZANAFLEX) 2 MG capsule; 1-2 tabs po qhs for back spasm  Dispense: 60  Cap; Refill: 0    4. Primary osteoarthritis involving multiple joints  With flare in that right knee, today we'll do a right knee cortisone injection  - methylPREDNISolone acetate (DEPO-MEDROL) injection 40 mg; 1 mL by Intra-articular route Once.    5. Ulcerative colitis without complications, unspecified location (CMS-HCC)  Stable[facility]    6. Sleep apnea, unspecified type    Procedure: Right knee cortisone injection    The procedure was explained to the patient in detail, all questions were answered. Risks and benefits were reviewed with patient and patient states understanding including risks of steroid injections including bleeding, infections, subcutaneous lipolysis and/or hypopigmentation at site of injection, and risk of avascular necrosis. Consent was obtained. The patient was positioned appropriately. Anatomical landmarks were identified.    Medial aspect of right knee was prepped with betadine x 3, local anesthetic with ethyl chloride and 1% Xylocaine lot #[id number], Exp July 2019 and using sterile technique,  injected 30 mg of Depomedrol Lot #[id number], Exp February 2018    EBL 0  The patient tolerated the procedure well, was observed in the office and there were no complications     Patient was asked to rest right knee for 3 days, patient states understanding and states will comply.  Approximately 30 minutes time was spent face to face with patient of which at least 50% of time was reviewing risk and benefits of procedure and the procedure in detail.      Followup: Return in about 4 months (around 9/25/2017). or sooner prn    Patient was seen 30 minutes face-to-face of which more than 50% of the time was spent counseling the patient (excluding time for procedures)  regarding  rheumatological condition and care. Therapy was discussed in detail.    Please note that this dictation was created using voice recognition software. I have made every reasonable attempt to correct  obvious errors, but I expect that there are errors of grammar and possibly content that I did not discover before finalizing the note.

## 2017-06-06 ENCOUNTER — APPOINTMENT (OUTPATIENT)
Dept: RHEUMATOLOGY | Facility: PHYSICIAN GROUP | Age: 72
End: 2017-06-06
Payer: MEDICARE

## 2017-06-14 ENCOUNTER — TELEPHONE (OUTPATIENT)
Dept: RHEUMATOLOGY | Facility: PHYSICIAN GROUP | Age: 72
End: 2017-06-14

## 2017-06-14 ENCOUNTER — HOSPITAL ENCOUNTER (OUTPATIENT)
Dept: RADIOLOGY | Facility: MEDICAL CENTER | Age: 72
End: 2017-06-14
Attending: INTERNAL MEDICINE
Payer: MEDICARE

## 2017-06-14 DIAGNOSIS — R25.2 SPASM: ICD-10-CM

## 2017-06-14 DIAGNOSIS — M1A.09X0 IDIOPATHIC CHRONIC GOUT OF MULTIPLE SITES WITHOUT TOPHUS: ICD-10-CM

## 2017-06-14 DIAGNOSIS — M25.561 CHRONIC PAIN OF RIGHT KNEE: ICD-10-CM

## 2017-06-14 DIAGNOSIS — G89.29 CHRONIC PAIN OF RIGHT KNEE: ICD-10-CM

## 2017-06-14 PROCEDURE — 73565 X-RAY EXAM OF KNEES: CPT

## 2017-06-14 PROCEDURE — 73721 MRI JNT OF LWR EXTRE W/O DYE: CPT | Mod: RT

## 2017-06-14 NOTE — TELEPHONE ENCOUNTER
Please notify patient we got the results of the MRI of his knee and it does show some pretty significant arthritis as well as cartilage and meniscal tears in the knee,  Orthopedic referral already submitted, recommend follow-up with orthopedics.

## 2017-08-04 ENCOUNTER — HOSPITAL ENCOUNTER (EMERGENCY)
Facility: MEDICAL CENTER | Age: 72
End: 2017-08-05
Attending: EMERGENCY MEDICINE
Payer: MEDICARE

## 2017-08-04 DIAGNOSIS — N20.0 KIDNEY STONE: ICD-10-CM

## 2017-08-04 DIAGNOSIS — K57.90 DIVERTICULOSIS OF INTESTINE WITHOUT BLEEDING, UNSPECIFIED INTESTINAL TRACT LOCATION: ICD-10-CM

## 2017-08-04 PROCEDURE — 99284 EMERGENCY DEPT VISIT MOD MDM: CPT

## 2017-08-04 RX ORDER — MORPHINE SULFATE 4 MG/ML
4 INJECTION, SOLUTION INTRAMUSCULAR; INTRAVENOUS ONCE
Status: COMPLETED | OUTPATIENT
Start: 2017-08-05 | End: 2017-08-05

## 2017-08-04 RX ORDER — ONDANSETRON 2 MG/ML
4 INJECTION INTRAMUSCULAR; INTRAVENOUS ONCE
Status: COMPLETED | OUTPATIENT
Start: 2017-08-05 | End: 2017-08-05

## 2017-08-04 ASSESSMENT — ENCOUNTER SYMPTOMS
COUGH: 0
DIZZINESS: 0
BACK PAIN: 0
ABDOMINAL PAIN: 1
FEVER: 0

## 2017-08-04 NOTE — ED AVS SNAPSHOT
Posterous Access Code: Activation code not generated  Current Posterous Status: Active    Spotistichart  A secure, online tool to manage your health information     [employer] Posterous® is a secure, online tool that connects you to your personalized health information from the privacy of your home -- day or night - making it very easy for you to manage your healthcare. Once the activation process is completed, you can even access your medical information using the Posterous martha, which is available for free in the Apple Martha store or Google Play store.     Posterous provides the following levels of access (as shown below):   My Chart Features   [facility] Primary Care Doctor [facility]  Specialists [facility]  Urgent  Care [facility]  Primary Care  Doctor   Email your healthcare team securely and privately 24/7 X X X X   Manage appointments: schedule your next appointment; view details of past/upcoming appointments X      Request prescription refills. X      View recent personal medical records, including lab and immunizations X X X X   View health record, including health history, allergies, medications X X X X   Read reports about your outpatient visits, procedures, consult and ER notes X X X X   See your discharge summary, which is a recap of your hospital and/or ER visit that includes your diagnosis, lab results, and care plan. X X       How to register for Posterous:  1. Go to  [email or web address].  2. Click on the Sign Up Now box, which takes you to the New Member Sign Up page. You will need to provide the following information:  a. Enter your Posterous Access Code exactly as it appears at the top of this page. (You will not need to use this code after you’ve completed the sign-up process. If you do not sign up before the expiration date, you must request a new code.)   b. Enter your date of birth.   c. Enter your home email address.   d. Click Submit, and follow the next screen’s instructions.  3. Create a Posterous ID. This will  be your [employer] login ID and cannot be changed, so think of one that is secure and easy to remember.  4. Create a [employer] password. You can change your password at any time.  5. Enter your Password Reset Question and Answer. This can be used at a later time if you forget your password.   6. Enter your e-mail address. This allows you to receive e-mail notifications when new information is available in [employer].  7. Click Sign Up. You can now view your health information.    For assistance activating your [employer] account, call [phone]

## 2017-08-04 NOTE — ED AVS SNAPSHOT
8/5/2017    Christopher Ho  [address]  [place] [address]    Dear Christopher:    [facility] wants to ensure your discharge home is safe and you or your loved ones have had all of your questions answered regarding your care after you leave the hospital.    Below is a list of resources and contact information should you have any questions regarding your hospital stay, follow-up instructions, or active medical symptoms.    Questions or Concerns Regarding… Contact   Medical Questions Related to Your Discharge  (7 days a week, 8am-5pm) Contact a Nurse Care Coordinator   [phone]   Medical Questions Not Related to Your Discharge  (24 hours a day / 7 days a week)  Contact the Nurse Health Line   [phone]    Medications or Discharge Instructions Refer to your discharge packet   or contact your [facility] Primary Care Provider   [phone]   Follow-up Appointment(s) Schedule your appointment via [employer]   or contact Scheduling [phone]   Billing Review your statement via [employer]  or contact Billing [phone]   Medical Records Review your records via [employer]   or contact Medical Records [phone]     You may receive a telephone call within two days of discharge. This call is to make certain you understand your discharge instructions and have the opportunity to have any questions answered. You can also easily access your medical information, test results and upcoming appointments via the [employer] free online health management tool. You can learn more and sign up at [email or web address]. For assistance setting up your [employer] account, please call [phone].    Once again, we want to ensure your discharge home is safe and that you have a clear understanding of any next steps in your care. If you have any questions or concerns, please do not hesitate to contact us, we are here for you. Thank you for choosing [facility] for your healthcare needs.    Sincerely,    Your [facility] Healthcare Team

## 2017-08-04 NOTE — ED AVS SNAPSHOT
Home Care Instructions                                                                                                                Christopher Ho   MRN: [id number]    Department:  [facility], Emergency Dept   Date of Visit:  8/4/2017            [facility], Emergency Dept    [address]    [place] [address]    Phone:  [phone]      You were seen by     Parris Corbett M.D.      Your Diagnosis Was     Kidney stone     N20.0       These are the medications you received during your hospitalization from 08/04/2017 2314 to 08/05/2017 0136     Date/Time Order Dose Route Action    08/05/2017 0005 morphine (pf) 4 mg/ml injection 4 mg 4 mg Intravenous Given    08/05/2017 0005 ondansetron (ZOFRAN) syringe/vial injection 4 mg 4 mg Intravenous Given    08/05/2017 0115 morphine (pf) 4 mg/ml injection 4 mg 4 mg Intravenous Given    08/05/2017 0135 ketorolac (TORADOL) injection 30 mg 30 mg Intravenous Given    08/05/2017 0135 tamsulosin (FLOMAX) capsule 0.4 mg 0.4 mg Oral Given      Follow-up Information     1. Schedule an appointment as soon as possible for a visit with Itz Aldana M.D..    Specialty:  Urology    Contact information    [address] [address]  [phone]        Medication Information     Review all of your home medications and newly ordered medications with your primary doctor and/or pharmacist as soon as possible. Follow medication instructions as directed by your doctor and/or pharmacist.     Please keep your complete medication list with you and share with your physician. Update the information when medications are discontinued, doses are changed, or new medications (including over-the-counter products) are added; and carry medication information at all times in the event of emergency situations.               Medication List      START taking these medications        Instructions    Morning Afternoon Evening Bedtime    oxycodone-acetaminophen  MG Tabs   Commonly known as:  PERCOCET-10        Take 1-2 Tabs by mouth every four hours as needed for Severe Pain.   Dose:  1-2 Tab                        tamsulosin 0.4 MG capsule   Last time this was given:  0.4 mg on 8/5/2017  1:35 AM   Commonly known as:  FLOMAX        Take 1 Cap by mouth every day for 7 days.   Dose:  0.4 mg                          ASK your doctor about these medications        Instructions    Morning Afternoon Evening Bedtime    allopurinol 300 MG Tabs   Commonly known as:  ZYLOPRIM        1 tab po qday                        atorvastatin 10 MG Tabs   Commonly known as:  LIPITOR        Take 10 mg by mouth every evening.   Dose:  10 mg                        INDOCIN PO        Take  by mouth.                        meloxicam 15 MG tablet   Commonly known as:  MOBIC        1 tab po qday for joint pain                        Mesalamine 0.375 GM Cp24        Take  by mouth.                        metformin 500 MG Tabs   Commonly known as:  GLUCOPHAGE        Take 500 mg by mouth 2 times a day, with meals.   Dose:  500 mg                        tizanidine 2 MG capsule   Commonly known as:  ZANAFLEX        1-2 tabs po qhs for back spasm                        zolpidem 5 MG Tabs   Commonly known as:  AMBIEN        Take 5 mg by mouth at bedtime as needed for Sleep.   Dose:  5 mg                             Where to Get Your Medications      You can get these medications from any pharmacy     Bring a paper prescription for each of these medications    - oxycodone-acetaminophen  MG Tabs  - tamsulosin 0.4 MG capsule            Procedures and tests performed during your visit     CBC WITH DIFFERENTIAL    COMP METABOLIC PANEL    CT-ABDOMEN-PELVIS WITH    ESTIMATED GFR    URINALYSIS CULTURE, IF INDICATED        Discharge Instructions       Diverticulosis  Diverticulosis is the condition that develops when small pouches (diverticula) form in the wall of your colon. Your  colon, or large intestine, is where water is absorbed and stool is formed. The pouches form when the inside layer of your colon pushes through weak spots in the outer layers of your colon.  CAUSES   No one knows exactly what causes diverticulosis.  RISK FACTORS  · Being older than 50. Your risk for this condition increases with age. Diverticulosis is rare in people younger than 40 years. By age 80, almost everyone has it.  · Eating a low-fiber diet.  · Being frequently constipated.  · Being overweight.  · Not getting enough exercise.  · Smoking.  · Taking over-the-counter pain medicines, like aspirin and ibuprofen.  SYMPTOMS   Most people with diverticulosis do not have symptoms.  DIAGNOSIS   Because diverticulosis often has no symptoms, health care providers often discover the condition during an exam for other colon problems. In many cases, a health care provider will diagnose diverticulosis while using a flexible scope to examine the colon (colonoscopy).  TREATMENT   If you have never developed an infection related to diverticulosis, you may not need treatment. If you have had an infection before, treatment may include:  · Eating more fruits, vegetables, and grains.  · Taking a fiber supplement.  · Taking a live bacteria supplement (probiotic).  · Taking medicine to relax your colon.  HOME CARE INSTRUCTIONS   · Drink at least 6-8 glasses of water each day to prevent constipation.  · Try not to strain when you have a bowel movement.  · Keep all follow-up appointments.  If you have had an infection before:   · Increase the fiber in your diet as directed by your health care provider or dietitian.  · Take a dietary fiber supplement if your health care provider approves.  · Only take medicines as directed by your health care provider.  SEEK MEDICAL CARE IF:   · You have abdominal pain.  · You have bloating.  · You have cramps.  · You have not gone to the bathroom in 3 days.  SEEK IMMEDIATE MEDICAL CARE IF:   · Your  pain gets worse.  · Your bloating becomes very bad.  · You have a fever or chills, and your symptoms suddenly get worse.  · You begin vomiting.  · You have bowel movements that are bloody or black.  MAKE SURE YOU:  · Understand these instructions.  · Will watch your condition.  · Will get help right away if you are not doing well or get worse.     This information is not intended to replace advice given to you by your health care provider. Make sure you discuss any questions you have with your health care provider.     Document Released: 09/14/2005 Document Revised: 12/23/2014 Document Reviewed: 11/12/2014  [employer] Interactive Patient Education ©2016 [employer]    Kidney Stones  Kidney stones (urolithiasis) are solid masses that form inside your kidneys. The intense pain is caused by the stone moving through the kidney, ureter, bladder, and urethra (urinary tract). When the stone moves, the ureter starts to spasm around the stone. The stone is usually passed in your pee (urine).   HOME CARE  · Drink enough fluids to keep your pee clear or pale yellow. This helps to get the stone out.  · Strain all pee through the provided strainer. Do not pee without peeing through the strainer, not even once. If you pee the stone out, catch it in the strainer. The stone may be as small as a grain of salt. Take this to your doctor. This will help your doctor figure out what you can do to try to prevent more kidney stones.  · Only take medicine as told by your doctor.  · Follow up with your doctor as told.  · Get follow-up X-rays as told by your doctor.  GET HELP IF:  You have pain that gets worse even if you have been taking pain medicine.  GET HELP RIGHT AWAY IF:   · Your pain does not get better with medicine.  · You have a fever or shaking chills.  · Your pain increases and gets worse over 18 hours.  · You have new belly (abdominal) pain.  · You feel faint or pass out.  · You are unable to pee.  MAKE SURE YOU:   · Understand  these instructions.  · Will watch your condition.  · Will get help right away if you are not doing well or get worse.     This information is not intended to replace advice given to you by your health care provider. Make sure you discuss any questions you have with your health care provider.     Document Released: 06/05/2009 Document Revised: 08/20/2014 Document Reviewed: 05/21/2014  [employer] Interactive Patient Education ©2016 [employer]            Patient Information     Patient Information    Following emergency treatment: all patient requiring follow-up care must return either to a private physician or a clinic if your condition worsens before you are able to obtain further medical attention, please return to the emergency room.     Billing Information    At [facility], we work to make the billing process streamlined for our patients.  Our Representatives are here to answer any questions you may have regarding your hospital bill.  If you have insurance coverage and have supplied your insurance information to us, we will submit a claim to your insurer on your behalf.  Should you have any questions regarding your bill, we can be reached online or by phone as follows:  Online: You are able pay your bills online or live chat with our representatives about any billing questions you may have. We are here to help Monday - Friday from 8:00am to 7:30pm and 9:00am - 12:00pm on Saturdays.  Please visit [email or web address]  for more information.   Phone:  [phone] or [phone]    Please note that your emergency physician, surgeon, pathologist, radiologist, anesthesiologist, and other specialists are not employed by [facility] and will therefore bill separately for their services.  Please contact them directly for any questions concerning their bills at the numbers below:     Emergency Physician Services:  [phone]  [facility]:  [phone]  [facility]:  [phone]  [facility]:  [phone]    1. Your final bill may vary from the amount quoted upon discharge if all procedures are not complete at that time, or if your doctor has additional procedures of which we are not aware. You will receive an additional bill if you return to the Emergency Department at [facility] for suture removal regardless of the facility of which the sutures were placed.     2. Please arrange for settlement of this account at the emergency registration.    3. All self-pay accounts are due in full at the time of treatment.  If you are unable to meet this obligation then payment is expected within 4-5 days.     4. If you have had radiology studies (CT, X-ray, Ultrasound, MRI), you have received a preliminary result during your emergency department visit. Please contact the radiology department [phone] to receive a copy of your final result. Please discuss the Final result with your primary physician or with the follow up physician provided.     Crisis Hotline:  [place] Crisis Hotline:  7-934-GHSEKWN or 1-223.704.4910  [place] Crisis Hotline:    1-264.926.6334 or 182-935-4050         ED Discharge Follow Up Questions    1. In order to provide you with very good care, we would like to follow up with a phone call in the next few days.  May we have your permission to contact you?     YES /  NO    2. What is the best phone number to call you? (       )_____-__________    3. What is the best time to call you?      Morning  /  Afternoon  /  Evening                   Patient Signature:  ____________________________________________________________    Date:  ____________________________________________________________      Your appointments     Sep 27, 2017  9:45 AM   Follow Up Visit with Sabrina Pino M.D.   [facility] (--)    [address], [address]  [place] [address] [phone]           You will be receiving a confirmation  call a few days before your appointment from our automated call confirmation system.

## 2017-08-05 ENCOUNTER — HOSPITAL ENCOUNTER (OUTPATIENT)
Dept: RADIOLOGY | Facility: MEDICAL CENTER | Age: 72
End: 2017-08-05
Attending: EMERGENCY MEDICINE
Payer: MEDICARE

## 2017-08-05 VITALS
HEART RATE: 95 BPM | SYSTOLIC BLOOD PRESSURE: 140 MMHG | DIASTOLIC BLOOD PRESSURE: 77 MMHG | HEIGHT: 73 IN | RESPIRATION RATE: 16 BRPM | TEMPERATURE: 98 F | OXYGEN SATURATION: 92 % | WEIGHT: 245 LBS | BODY MASS INDEX: 32.47 KG/M2

## 2017-08-05 LAB
ALBUMIN SERPL BCP-MCNC: 4 G/DL (ref 3.2–4.9)
ALBUMIN/GLOB SERPL: 1.5 G/DL
ALP SERPL-CCNC: 50 U/L (ref 30–99)
ALT SERPL-CCNC: 25 U/L (ref 2–50)
ANION GAP SERPL CALC-SCNC: 9 MMOL/L (ref 0–11.9)
APPEARANCE UR: CLEAR
AST SERPL-CCNC: 22 U/L (ref 12–45)
BASOPHILS # BLD AUTO: 0.5 % (ref 0–1.8)
BASOPHILS # BLD: 0.04 K/UL (ref 0–0.12)
BILIRUB SERPL-MCNC: 0.7 MG/DL (ref 0.1–1.5)
BILIRUB UR QL STRIP.AUTO: NEGATIVE
BUN SERPL-MCNC: 16 MG/DL (ref 8–22)
CALCIUM SERPL-MCNC: 9 MG/DL (ref 8.4–10.2)
CHLORIDE SERPL-SCNC: 106 MMOL/L (ref 96–112)
CO2 SERPL-SCNC: 20 MMOL/L (ref 20–33)
COLOR UR: YELLOW
CREAT SERPL-MCNC: 1.4 MG/DL (ref 0.5–1.4)
CULTURE IF INDICATED INDCX: NO UA CULTURE
EOSINOPHIL # BLD AUTO: 0.08 K/UL (ref 0–0.51)
EOSINOPHIL NFR BLD: 0.9 % (ref 0–6.9)
ERYTHROCYTE [DISTWIDTH] IN BLOOD BY AUTOMATED COUNT: 45.2 FL (ref 35.9–50)
GFR SERPL CREATININE-BSD FRML MDRD: 50 ML/MIN/1.73 M 2
GLOBULIN SER CALC-MCNC: 2.7 G/DL (ref 1.9–3.5)
GLUCOSE SERPL-MCNC: 171 MG/DL (ref 65–99)
GLUCOSE UR STRIP.AUTO-MCNC: NEGATIVE MG/DL
HCT VFR BLD AUTO: 41.3 % (ref 42–52)
HGB BLD-MCNC: 14.5 G/DL (ref 14–18)
IMM GRANULOCYTES # BLD AUTO: 0.02 K/UL (ref 0–0.11)
IMM GRANULOCYTES NFR BLD AUTO: 0.2 % (ref 0–0.9)
KETONES UR STRIP.AUTO-MCNC: ABNORMAL MG/DL
LEUKOCYTE ESTERASE UR QL STRIP.AUTO: NEGATIVE
LYMPHOCYTES # BLD AUTO: 2.08 K/UL (ref 1–4.8)
LYMPHOCYTES NFR BLD: 24.4 % (ref 22–41)
MCH RBC QN AUTO: 32.2 PG (ref 27–33)
MCHC RBC AUTO-ENTMCNC: 35.1 G/DL (ref 33.7–35.3)
MCV RBC AUTO: 91.6 FL (ref 81.4–97.8)
MICRO URNS: ABNORMAL
MONOCYTES # BLD AUTO: 0.52 K/UL (ref 0–0.85)
MONOCYTES NFR BLD AUTO: 6.1 % (ref 0–13.4)
NEUTROPHILS # BLD AUTO: 5.78 K/UL (ref 1.82–7.42)
NEUTROPHILS NFR BLD: 67.9 % (ref 44–72)
NITRITE UR QL STRIP.AUTO: NEGATIVE
NRBC # BLD AUTO: 0 K/UL
NRBC BLD AUTO-RTO: 0 /100 WBC
PH UR STRIP.AUTO: 5 [PH]
PLATELET # BLD AUTO: 177 K/UL (ref 164–446)
PMV BLD AUTO: 9.1 FL (ref 9–12.9)
POTASSIUM SERPL-SCNC: 3.5 MMOL/L (ref 3.6–5.5)
PROT SERPL-MCNC: 6.7 G/DL (ref 6–8.2)
PROT UR QL STRIP: NEGATIVE MG/DL
RBC # BLD AUTO: 4.51 M/UL (ref 4.7–6.1)
RBC UR QL AUTO: NEGATIVE
SODIUM SERPL-SCNC: 135 MMOL/L (ref 135–145)
SP GR UR STRIP.AUTO: 1.01
WBC # BLD AUTO: 8.5 K/UL (ref 4.8–10.8)

## 2017-08-05 PROCEDURE — 96375 TX/PRO/DX INJ NEW DRUG ADDON: CPT

## 2017-08-05 PROCEDURE — 700117 HCHG RX CONTRAST REV CODE 255: Performed by: EMERGENCY MEDICINE

## 2017-08-05 PROCEDURE — 36415 COLL VENOUS BLD VENIPUNCTURE: CPT

## 2017-08-05 PROCEDURE — 700111 HCHG RX REV CODE 636 W/ 250 OVERRIDE (IP): Performed by: EMERGENCY MEDICINE

## 2017-08-05 PROCEDURE — 81003 URINALYSIS AUTO W/O SCOPE: CPT

## 2017-08-05 PROCEDURE — 96376 TX/PRO/DX INJ SAME DRUG ADON: CPT

## 2017-08-05 PROCEDURE — A9270 NON-COVERED ITEM OR SERVICE: HCPCS | Performed by: EMERGENCY MEDICINE

## 2017-08-05 PROCEDURE — 80053 COMPREHEN METABOLIC PANEL: CPT

## 2017-08-05 PROCEDURE — 700102 HCHG RX REV CODE 250 W/ 637 OVERRIDE(OP): Performed by: EMERGENCY MEDICINE

## 2017-08-05 PROCEDURE — 74177 CT ABD & PELVIS W/CONTRAST: CPT

## 2017-08-05 PROCEDURE — 85025 COMPLETE CBC W/AUTO DIFF WBC: CPT

## 2017-08-05 PROCEDURE — 96374 THER/PROPH/DIAG INJ IV PUSH: CPT

## 2017-08-05 RX ORDER — MORPHINE SULFATE 4 MG/ML
4 INJECTION, SOLUTION INTRAMUSCULAR; INTRAVENOUS ONCE
Status: COMPLETED | OUTPATIENT
Start: 2017-08-05 | End: 2017-08-05

## 2017-08-05 RX ORDER — TAMSULOSIN HYDROCHLORIDE 0.4 MG/1
0.4 CAPSULE ORAL ONCE
Status: COMPLETED | OUTPATIENT
Start: 2017-08-05 | End: 2017-08-05

## 2017-08-05 RX ORDER — KETOROLAC TROMETHAMINE 30 MG/ML
30 INJECTION, SOLUTION INTRAMUSCULAR; INTRAVENOUS ONCE
Status: COMPLETED | OUTPATIENT
Start: 2017-08-05 | End: 2017-08-05

## 2017-08-05 RX ORDER — OXYCODONE AND ACETAMINOPHEN 10; 325 MG/1; MG/1
1-2 TABLET ORAL EVERY 4 HOURS PRN
Qty: 20 TAB | Refills: 0 | Status: ON HOLD | OUTPATIENT
Start: 2017-08-05 | End: 2017-09-28

## 2017-08-05 RX ORDER — TAMSULOSIN HYDROCHLORIDE 0.4 MG/1
0.4 CAPSULE ORAL DAILY
Qty: 7 CAP | Refills: 0 | Status: SHIPPED | OUTPATIENT
Start: 2017-08-05 | End: 2017-08-12

## 2017-08-05 RX ADMIN — IOHEXOL 100 ML: 350 INJECTION, SOLUTION INTRAVENOUS at 00:58

## 2017-08-05 RX ADMIN — ONDANSETRON 4 MG: 2 INJECTION INTRAMUSCULAR; INTRAVENOUS at 00:05

## 2017-08-05 RX ADMIN — MORPHINE SULFATE 4 MG: 4 INJECTION INTRAVENOUS at 01:15

## 2017-08-05 RX ADMIN — TAMSULOSIN HYDROCHLORIDE 0.4 MG: 0.4 CAPSULE ORAL at 01:35

## 2017-08-05 RX ADMIN — KETOROLAC TROMETHAMINE 30 MG: 30 INJECTION, SOLUTION INTRAMUSCULAR at 01:35

## 2017-08-05 RX ADMIN — MORPHINE SULFATE 4 MG: 4 INJECTION INTRAVENOUS at 00:05

## 2017-08-05 ASSESSMENT — PAIN SCALES - GENERAL: PAINLEVEL_OUTOF10: 1

## 2017-08-05 NOTE — DISCHARGE INSTRUCTIONS
Diverticulosis  Diverticulosis is the condition that develops when small pouches (diverticula) form in the wall of your colon. Your colon, or large intestine, is where water is absorbed and stool is formed. The pouches form when the inside layer of your colon pushes through weak spots in the outer layers of your colon.  CAUSES   No one knows exactly what causes diverticulosis.  RISK FACTORS  · Being older than 50. Your risk for this condition increases with age. Diverticulosis is rare in people younger than 40 years. By age 80, almost everyone has it.  · Eating a low-fiber diet.  · Being frequently constipated.  · Being overweight.  · Not getting enough exercise.  · Smoking.  · Taking over-the-counter pain medicines, like aspirin and ibuprofen.  SYMPTOMS   Most people with diverticulosis do not have symptoms.  DIAGNOSIS   Because diverticulosis often has no symptoms, health care providers often discover the condition during an exam for other colon problems. In many cases, a health care provider will diagnose diverticulosis while using a flexible scope to examine the colon (colonoscopy).  TREATMENT   If you have never developed an infection related to diverticulosis, you may not need treatment. If you have had an infection before, treatment may include:  · Eating more fruits, vegetables, and grains.  · Taking a fiber supplement.  · Taking a live bacteria supplement (probiotic).  · Taking medicine to relax your colon.  HOME CARE INSTRUCTIONS   · Drink at least 6-8 glasses of water each day to prevent constipation.  · Try not to strain when you have a bowel movement.  · Keep all follow-up appointments.  If you have had an infection before:   · Increase the fiber in your diet as directed by your health care provider or dietitian.  · Take a dietary fiber supplement if your health care provider approves.  · Only take medicines as directed by your health care provider.  SEEK MEDICAL CARE IF:   · You have abdominal  pain.  · You have bloating.  · You have cramps.  · You have not gone to the bathroom in 3 days.  SEEK IMMEDIATE MEDICAL CARE IF:   · Your pain gets worse.  · Your bloating becomes very bad.  · You have a fever or chills, and your symptoms suddenly get worse.  · You begin vomiting.  · You have bowel movements that are bloody or black.  MAKE SURE YOU:  · Understand these instructions.  · Will watch your condition.  · Will get help right away if you are not doing well or get worse.     This information is not intended to replace advice given to you by your health care provider. Make sure you discuss any questions you have with your health care provider.     Document Released: 09/14/2005 Document Revised: 12/23/2014 Document Reviewed: 11/12/2014  [employer] Interactive Patient Education ©2016 [employer]    Kidney Stones  Kidney stones (urolithiasis) are solid masses that form inside your kidneys. The intense pain is caused by the stone moving through the kidney, ureter, bladder, and urethra (urinary tract). When the stone moves, the ureter starts to spasm around the stone. The stone is usually passed in your pee (urine).   HOME CARE  · Drink enough fluids to keep your pee clear or pale yellow. This helps to get the stone out.  · Strain all pee through the provided strainer. Do not pee without peeing through the strainer, not even once. If you pee the stone out, catch it in the strainer. The stone may be as small as a grain of salt. Take this to your doctor. This will help your doctor figure out what you can do to try to prevent more kidney stones.  · Only take medicine as told by your doctor.  · Follow up with your doctor as told.  · Get follow-up X-rays as told by your doctor.  GET HELP IF:  You have pain that gets worse even if you have been taking pain medicine.  GET HELP RIGHT AWAY IF:   · Your pain does not get better with medicine.  · You have a fever or shaking chills.  · Your pain increases and gets worse over  18 hours.  · You have new belly (abdominal) pain.  · You feel faint or pass out.  · You are unable to pee.  MAKE SURE YOU:   · Understand these instructions.  · Will watch your condition.  · Will get help right away if you are not doing well or get worse.     This information is not intended to replace advice given to you by your health care provider. Make sure you discuss any questions you have with your health care provider.     Document Released: 06/05/2009 Document Revised: 08/20/2014 Document Reviewed: 05/21/2014  [employer] Interactive Patient Education ©2016 [employer]

## 2017-08-05 NOTE — ED PROVIDER NOTES
"ED Provider Note    ED Provider Note          CHIEF COMPLAINT  Chief Complaint   Patient presents with   • Flank Pain   • N/V       HPI  Christopher Ho is a 72 y.o. male who presents to the Emergency Department for concern of right sided abdominal/flank pain that started earlier this evening around 7:30. Basically sat down for dinner and then he started having some pain located around the right flank as well as the right lower quadrant. The pain came on acutely. The pain has been persistent. The pain does not radiate. It is associated nausea and vomiting with it. He has never had any pain like this before. He denies any dysuria or fevers.    REVIEW OF SYSTEMS  Review of Systems   Constitutional: Negative for fever.   HENT: Negative for congestion.    Eyes: Negative for visual disturbance.   Respiratory: Negative for cough.    Cardiovascular: Negative for chest pain.   Gastrointestinal: Positive for abdominal pain.   Genitourinary: Negative for difficulty urinating.   Musculoskeletal: Negative for back pain.   Allergic/Immunologic: Negative for immunocompromised state.   Neurological: Negative for dizziness.       PAST MEDICAL HISTORY       SURGICAL HISTORY   has past surgical history that includes eye laceration repair (11/30/2011) and lacrimal duct probe (11/30/2011).    SOCIAL HISTORY  Social History   Substance Use Topics   • Smoking status: Not on file   • Smokeless tobacco: Not on file   • Alcohol Use: Not on file      History   Drug Use Not on file       FAMILY HISTORY  No family history on file.    CURRENT MEDICATIONS  Reviewed.  See Encounter Summary.     ALLERGIES  No Known Allergies    PHYSICAL EXAM  VITAL SIGNS: /77 mmHg  Pulse 95  Temp(Src) 36.7 °C (98 °F)  Resp 16  Ht 1.854 m (6' 1\")  Wt 111.131 kg (245 lb)  BMI 32.33 kg/m2  SpO2 92%  Physical Exam   Constitutional: He is oriented to person, place, and time. He appears well-developed.   HENT:   Head: Normocephalic and atraumatic.   Eyes: " Pupils are equal, round, and reactive to light.   Neck: Normal range of motion.   Cardiovascular: Normal rate.    Pulmonary/Chest: Effort normal.   Abdominal: Soft. There is tenderness in the right lower quadrant. There is no rigidity, no rebound and no CVA tenderness.   Musculoskeletal: Normal range of motion. He exhibits no edema.   Neurological: He is alert and oriented to person, place, and time.   Skin: Skin is warm.   Psychiatric: He has a normal mood and affect.           DIAGNOSTIC STUDIES / PROCEDURES     LABS  Results for orders placed or performed during the hospital encounter of 08/04/17   CBC WITH DIFFERENTIAL   Result Value Ref Range    WBC 8.5 4.8 - 10.8 K/uL    RBC 4.51 (L) 4.70 - 6.10 M/uL    Hemoglobin 14.5 14.0 - 18.0 g/dL    Hematocrit 41.3 (L) 42.0 - 52.0 %    MCV 91.6 81.4 - 97.8 fL    MCH 32.2 27.0 - 33.0 pg    MCHC 35.1 33.7 - 35.3 g/dL    RDW 45.2 35.9 - 50.0 fL    Platelet Count 177 164 - 446 K/uL    MPV 9.1 9.0 - 12.9 fL    Neutrophils-Polys 67.90 44.00 - 72.00 %    Lymphocytes 24.40 22.00 - 41.00 %    Monocytes 6.10 0.00 - 13.40 %    Eosinophils 0.90 0.00 - 6.90 %    Basophils 0.50 0.00 - 1.80 %    Immature Granulocytes 0.20 0.00 - 0.90 %    Nucleated RBC 0.00 /100 WBC    Neutrophils (Absolute) 5.78 1.82 - 7.42 K/uL    Lymphs (Absolute) 2.08 1.00 - 4.80 K/uL    Monos (Absolute) 0.52 0.00 - 0.85 K/uL    Eos (Absolute) 0.08 0.00 - 0.51 K/uL    Baso (Absolute) 0.04 0.00 - 0.12 K/uL    Immature Granulocytes (abs) 0.02 0.00 - 0.11 K/uL    NRBC (Absolute) 0.00 K/uL   COMP METABOLIC PANEL   Result Value Ref Range    Sodium 135 135 - 145 mmol/L    Potassium 3.5 (L) 3.6 - 5.5 mmol/L    Chloride 106 96 - 112 mmol/L    Co2 20 20 - 33 mmol/L    Anion Gap 9.0 0.0 - 11.9    Glucose 171 (H) 65 - 99 mg/dL    Bun 16 8 - 22 mg/dL    Creatinine 1.40 0.50 - 1.40 mg/dL    Calcium 9.0 8.4 - 10.2 mg/dL    AST(SGOT) 22 12 - 45 U/L    ALT(SGPT) 25 2 - 50 U/L    Alkaline Phosphatase 50 30 - 99 U/L    Total  Bilirubin 0.7 0.1 - 1.5 mg/dL    Albumin 4.0 3.2 - 4.9 g/dL    Total Protein 6.7 6.0 - 8.2 g/dL    Globulin 2.7 1.9 - 3.5 g/dL    A-G Ratio 1.5 g/dL   URINALYSIS CULTURE, IF INDICATED   Result Value Ref Range    Color Yellow     Character Clear     Specific Gravity 1.010 <1.035    Ph 5.0 5.0-8.0    Glucose Negative Negative mg/dL    Ketones Trace (A) Negative mg/dL    Protein Negative Negative mg/dL    Bilirubin Negative Negative    Nitrite Negative Negative    Leukocyte Esterase Negative Negative    Occult Blood Negative Negative    Micro Urine Req see below     Culture Indicated No UA Culture   ESTIMATED GFR   Result Value Ref Range    GFR If African American >60 >60 mL/min/1.73 m 2    GFR If Non African American 50 (A) >60 mL/min/1.73 m 2       All labs were reviewed by me.        RADIOLOGY  CT-ABDOMEN-PELVIS WITH   Final Result      1.  3 mm right UVJ stone, with moderate resultant hydronephrosis.   2.  No other acute abdominal or pelvic findings.   3.  Colonic diverticulosis.   4.  Small hiatal hernia.        The radiologist's interpretation of all radiological studies have been reviewed by me.    COURSE & MEDICAL DECISION MAKING  Pertinent Labs & Imaging studies reviewed. (See chart for details)    11:32 PM - Patient seen and examined at bedside.     Decision Making:  This is a 72 y.o. year old male who presents with concern of acute onset of some lower abdominal pain and flank pain. He presented here in no acute distress however he did have some tenderness along the right side of his abdomen and flank. Differential includes nephrolithiasis, UTI, pyelonephritis, appendicitis. Urinalysis shows no sign of infection and he has no leukocytosis and no renal insufficiency. CT scan was done which did show a 3 mm UVJ stone. He did have some hydronephrosis however with no signs of any infection, a 3 mm stone he give a trial of passage at this time with the plan to follow up with urology in the clinic for follow-up and  hydronephrosis. Urinary takes Flomax at home and was given a prescription for it if he needs refills. He is given Percocet also and instructed to continue pain control at home and follow up with urology.    FINAL IMPRESSION  1. Kidney stone    2. Diverticulosis of intestine without bleeding, unspecified intestinal tract location

## 2017-08-05 NOTE — ED NOTES
0005 Iv placed , labs drawn and taken to lab. poc update given to pt, results pending at this time  0010 Pt medicated as directed by TRISTAN penn, poc update given to pt. Further orders and dispo pending at this time. No further questions from pt at this time

## 2017-08-23 ENCOUNTER — HOSPITAL ENCOUNTER (OUTPATIENT)
Dept: RADIOLOGY | Facility: MEDICAL CENTER | Age: 72
End: 2017-08-23
Attending: FAMILY MEDICINE
Payer: MEDICARE

## 2017-08-23 DIAGNOSIS — R94.31 ABNORMAL ELECTROCARDIOGRAM: ICD-10-CM

## 2017-08-23 PROCEDURE — A9502 TC99M TETROFOSMIN: HCPCS

## 2017-08-23 PROCEDURE — 700111 HCHG RX REV CODE 636 W/ 250 OVERRIDE (IP)

## 2017-08-23 RX ORDER — REGADENOSON 0.08 MG/ML
INJECTION, SOLUTION INTRAVENOUS
Status: COMPLETED
Start: 2017-08-23 | End: 2017-08-23

## 2017-08-23 RX ADMIN — REGADENOSON 0.4 MG: 0.08 INJECTION, SOLUTION INTRAVENOUS at 10:19

## 2017-08-23 NOTE — PROGRESS NOTES
Nursing care plan includes knowledge deficit, potential for discomfort, potential for compromised cardiac output. POC includes teaching, comfort measures and reassurance, and access to code cart, cardiology stand by and availability of rapid response team. Pt verbalizes good understanding of benefits and risks of pharmacological cardiac stress test. Informed consent obtained. Lexiscan given, pt developed the following symptoms SOB and lightheadedness. VS stable, major symptoms resolved. To waiting room, caffeinated fluids and/or snacks given, awaiting second scan. Nursing goals met.

## 2017-09-14 ENCOUNTER — APPOINTMENT (OUTPATIENT)
Dept: OTHER | Facility: IMAGING CENTER | Age: 72
End: 2017-09-14

## 2017-09-14 DIAGNOSIS — Z01.812 PRE-PROCEDURAL LABORATORY EXAMINATION: ICD-10-CM

## 2017-09-14 DIAGNOSIS — Z01.810 PRE-OPERATIVE CARDIOVASCULAR EXAMINATION: ICD-10-CM

## 2017-09-14 LAB
ANION GAP SERPL CALC-SCNC: 7 MMOL/L (ref 0–11.9)
APPEARANCE UR: CLEAR
BASOPHILS # BLD AUTO: 0.4 % (ref 0–1.8)
BASOPHILS # BLD: 0.02 K/UL (ref 0–0.12)
BILIRUB UR QL STRIP.AUTO: NEGATIVE
BUN SERPL-MCNC: 14 MG/DL (ref 8–22)
CALCIUM SERPL-MCNC: 9.5 MG/DL (ref 8.5–10.5)
CHLORIDE SERPL-SCNC: 105 MMOL/L (ref 96–112)
CO2 SERPL-SCNC: 26 MMOL/L (ref 20–33)
COLOR UR: YELLOW
CREAT SERPL-MCNC: 0.94 MG/DL (ref 0.5–1.4)
CULTURE IF INDICATED INDCX: NO UA CULTURE
EKG IMPRESSION: NORMAL
EOSINOPHIL # BLD AUTO: 0.08 K/UL (ref 0–0.51)
EOSINOPHIL NFR BLD: 1.7 % (ref 0–6.9)
ERYTHROCYTE [DISTWIDTH] IN BLOOD BY AUTOMATED COUNT: 45.2 FL (ref 35.9–50)
GFR SERPL CREATININE-BSD FRML MDRD: >60 ML/MIN/1.73 M 2
GLUCOSE SERPL-MCNC: 126 MG/DL (ref 65–99)
GLUCOSE UR STRIP.AUTO-MCNC: NEGATIVE MG/DL
HCT VFR BLD AUTO: 44.5 % (ref 42–52)
HGB BLD-MCNC: 15.2 G/DL (ref 14–18)
HIV 1+2 AB+HIV1 P24 AG SERPL QL IA: NON REACTIVE
IMM GRANULOCYTES # BLD AUTO: 0.01 K/UL (ref 0–0.11)
IMM GRANULOCYTES NFR BLD AUTO: 0.2 % (ref 0–0.9)
KETONES UR STRIP.AUTO-MCNC: NEGATIVE MG/DL
LEUKOCYTE ESTERASE UR QL STRIP.AUTO: NEGATIVE
LYMPHOCYTES # BLD AUTO: 1.41 K/UL (ref 1–4.8)
LYMPHOCYTES NFR BLD: 30.3 % (ref 22–41)
MCH RBC QN AUTO: 32.7 PG (ref 27–33)
MCHC RBC AUTO-ENTMCNC: 34.2 G/DL (ref 33.7–35.3)
MCV RBC AUTO: 95.7 FL (ref 81.4–97.8)
MICRO URNS: NORMAL
MONOCYTES # BLD AUTO: 0.35 K/UL (ref 0–0.85)
MONOCYTES NFR BLD AUTO: 7.5 % (ref 0–13.4)
NEUTROPHILS # BLD AUTO: 2.79 K/UL (ref 1.82–7.42)
NEUTROPHILS NFR BLD: 59.9 % (ref 44–72)
NITRITE UR QL STRIP.AUTO: NEGATIVE
NRBC # BLD AUTO: 0 K/UL
NRBC BLD AUTO-RTO: 0 /100 WBC
PH UR STRIP.AUTO: 6 [PH]
PLATELET # BLD AUTO: 166 K/UL (ref 164–446)
PMV BLD AUTO: 9.6 FL (ref 9–12.9)
POTASSIUM SERPL-SCNC: 4.4 MMOL/L (ref 3.6–5.5)
PROT UR QL STRIP: NEGATIVE MG/DL
RBC # BLD AUTO: 4.65 M/UL (ref 4.7–6.1)
RBC UR QL AUTO: NEGATIVE
SCCMEC + MECA PNL NOSE NAA+PROBE: NEGATIVE
SCCMEC + MECA PNL NOSE NAA+PROBE: NEGATIVE
SODIUM SERPL-SCNC: 138 MMOL/L (ref 135–145)
SP GR UR STRIP.AUTO: 1
UROBILINOGEN UR STRIP.AUTO-MCNC: NORMAL MG/DL
WBC # BLD AUTO: 4.7 K/UL (ref 4.8–10.8)

## 2017-09-14 PROCEDURE — 93005 ELECTROCARDIOGRAM TRACING: CPT

## 2017-09-14 PROCEDURE — 36415 COLL VENOUS BLD VENIPUNCTURE: CPT

## 2017-09-14 PROCEDURE — 81003 URINALYSIS AUTO W/O SCOPE: CPT

## 2017-09-14 PROCEDURE — 85025 COMPLETE CBC W/AUTO DIFF WBC: CPT

## 2017-09-14 PROCEDURE — 87640 STAPH A DNA AMP PROBE: CPT

## 2017-09-14 PROCEDURE — 87389 HIV-1 AG W/HIV-1&-2 AB AG IA: CPT

## 2017-09-14 PROCEDURE — 93010 ELECTROCARDIOGRAM REPORT: CPT | Performed by: INTERNAL MEDICINE

## 2017-09-14 PROCEDURE — 80048 BASIC METABOLIC PNL TOTAL CA: CPT

## 2017-09-14 PROCEDURE — 87641 MR-STAPH DNA AMP PROBE: CPT

## 2017-09-14 RX ORDER — TAMSULOSIN HYDROCHLORIDE 0.4 MG/1
0.8 CAPSULE ORAL
COMMUNITY

## 2017-09-14 RX ORDER — ALLOPURINOL 100 MG/1
100 TABLET ORAL DAILY
COMMUNITY
End: 2017-10-03 | Stop reason: SDUPTHER

## 2017-09-14 NOTE — DISCHARGE PLANNING
DISCHARGE PLANNING NOTE - TOTAL JOINT     Procedure: Procedure(s):  KNEE ARTHROPLASTY TOTAL  Procedure Date: 9/28/2017  Insurance:  Payor: [employer]   Equipment currently available at home? None  Steps into the home? 2 no railing  Steps within the home? 0  Toilet height? ADA  Type of shower? walk-in shower  Who will be with you during your recovery? spouse  Is Outpatient Physical Therapy set up after surgery? No   Did you take the Total Joint Class and where? Yes, at [facility]     Plan: Patient will need a FWW. DME Choice form signed for [facility] and scanned into Media tab. There is an order for a walker on the Pre-op orders/Scheduling fax in Media tab. Reviewed Equipment Resource list and provided a copy to the patient. Recommended he obtain a shower chair, and raised toilet seat.

## 2017-09-27 ENCOUNTER — APPOINTMENT (OUTPATIENT)
Dept: RHEUMATOLOGY | Facility: PHYSICIAN GROUP | Age: 72
End: 2017-09-27
Payer: MEDICARE

## 2017-09-28 ENCOUNTER — HOSPITAL ENCOUNTER (INPATIENT)
Facility: MEDICAL CENTER | Age: 72
LOS: 1 days | DRG: 470 | End: 2017-09-29
Attending: ORTHOPAEDIC SURGERY | Admitting: ORTHOPAEDIC SURGERY
Payer: MEDICARE

## 2017-09-28 PROBLEM — M17.11 OSTEOARTHRITIS OF RIGHT KNEE: Status: ACTIVE | Noted: 2017-09-28

## 2017-09-28 LAB
GLUCOSE BLD-MCNC: 114 MG/DL (ref 65–99)
GLUCOSE BLD-MCNC: 174 MG/DL (ref 65–99)

## 2017-09-28 PROCEDURE — 160009 HCHG ANES TIME/MIN: Performed by: ORTHOPAEDIC SURGERY

## 2017-09-28 PROCEDURE — 502000 HCHG MISC OR IMPLANTS RC 0278: Performed by: ORTHOPAEDIC SURGERY

## 2017-09-28 PROCEDURE — 700102 HCHG RX REV CODE 250 W/ 637 OVERRIDE(OP)

## 2017-09-28 PROCEDURE — 160035 HCHG PACU - 1ST 60 MINS PHASE I: Performed by: ORTHOPAEDIC SURGERY

## 2017-09-28 PROCEDURE — 700111 HCHG RX REV CODE 636 W/ 250 OVERRIDE (IP)

## 2017-09-28 PROCEDURE — 700105 HCHG RX REV CODE 258: Performed by: ORTHOPAEDIC SURGERY

## 2017-09-28 PROCEDURE — 700112 HCHG RX REV CODE 229: Performed by: ORTHOPAEDIC SURGERY

## 2017-09-28 PROCEDURE — 501486 HCHG STRYKER IRRIG SET HC W/TUBING: Performed by: ORTHOPAEDIC SURGERY

## 2017-09-28 PROCEDURE — 700101 HCHG RX REV CODE 250: Performed by: ORTHOPAEDIC SURGERY

## 2017-09-28 PROCEDURE — 500811 HCHG LENS/HOOD FOR SPACESUIT: Performed by: ORTHOPAEDIC SURGERY

## 2017-09-28 PROCEDURE — 700102 HCHG RX REV CODE 250 W/ 637 OVERRIDE(OP): Performed by: ORTHOPAEDIC SURGERY

## 2017-09-28 PROCEDURE — A9270 NON-COVERED ITEM OR SERVICE: HCPCS | Performed by: ORTHOPAEDIC SURGERY

## 2017-09-28 PROCEDURE — 160002 HCHG RECOVERY MINUTES (STAT): Performed by: ORTHOPAEDIC SURGERY

## 2017-09-28 PROCEDURE — 500002 HCHG ADHESIVE, DERMABOND: Performed by: ORTHOPAEDIC SURGERY

## 2017-09-28 PROCEDURE — 700101 HCHG RX REV CODE 250

## 2017-09-28 PROCEDURE — 3E0234Z INTRODUCTION OF SERUM, TOXOID AND VACCINE INTO MUSCLE, PERCUTANEOUS APPROACH: ICD-10-PCS | Performed by: ORTHOPAEDIC SURGERY

## 2017-09-28 PROCEDURE — 160022 HCHG BLOCK: Performed by: ORTHOPAEDIC SURGERY

## 2017-09-28 PROCEDURE — 160048 HCHG OR STATISTICAL LEVEL 1-5: Performed by: ORTHOPAEDIC SURGERY

## 2017-09-28 PROCEDURE — 500097 HCHG BONE CEMENT, SIMPLEX FULL DOSE: Performed by: ORTHOPAEDIC SURGERY

## 2017-09-28 PROCEDURE — 160041 HCHG SURGERY MINUTES - EA ADDL 1 MIN LEVEL 4: Performed by: ORTHOPAEDIC SURGERY

## 2017-09-28 PROCEDURE — 700111 HCHG RX REV CODE 636 W/ 250 OVERRIDE (IP): Performed by: ORTHOPAEDIC SURGERY

## 2017-09-28 PROCEDURE — 501487 HCHG STRYKER TIP: Performed by: ORTHOPAEDIC SURGERY

## 2017-09-28 PROCEDURE — 90471 IMMUNIZATION ADMIN: CPT

## 2017-09-28 PROCEDURE — 82962 GLUCOSE BLOOD TEST: CPT

## 2017-09-28 PROCEDURE — 0SRC0J9 REPLACEMENT OF RIGHT KNEE JOINT WITH SYNTHETIC SUBSTITUTE, CEMENTED, OPEN APPROACH: ICD-10-PCS | Performed by: ORTHOPAEDIC SURGERY

## 2017-09-28 PROCEDURE — 502579 HCHG PACK, TOTAL KNEE: Performed by: ORTHOPAEDIC SURGERY

## 2017-09-28 PROCEDURE — 770006 HCHG ROOM/CARE - MED/SURG/GYN SEMI*

## 2017-09-28 PROCEDURE — A9270 NON-COVERED ITEM OR SERVICE: HCPCS

## 2017-09-28 PROCEDURE — 501480 HCHG STOCKINETTE: Performed by: ORTHOPAEDIC SURGERY

## 2017-09-28 PROCEDURE — 500088 HCHG BLADE, SAGITTAL: Performed by: ORTHOPAEDIC SURGERY

## 2017-09-28 PROCEDURE — 160029 HCHG SURGERY MINUTES - 1ST 30 MINS LEVEL 4: Performed by: ORTHOPAEDIC SURGERY

## 2017-09-28 PROCEDURE — 160036 HCHG PACU - EA ADDL 30 MINS PHASE I: Performed by: ORTHOPAEDIC SURGERY

## 2017-09-28 PROCEDURE — 90662 IIV NO PRSV INCREASED AG IM: CPT

## 2017-09-28 PROCEDURE — 3E0T3CZ INTRODUCTION OF REGIONAL ANESTHETIC INTO PERIPHERAL NERVES AND PLEXI, PERCUTANEOUS APPROACH: ICD-10-PCS | Performed by: ANESTHESIOLOGY

## 2017-09-28 PROCEDURE — 500093 HCHG BONE CEMENT MIXER: Performed by: ORTHOPAEDIC SURGERY

## 2017-09-28 PROCEDURE — 501838 HCHG SUTURE GENERAL: Performed by: ORTHOPAEDIC SURGERY

## 2017-09-28 DEVICE — PATELLA GII OVAL RESURFACING PAT 35MM (1EA): Type: IMPLANTABLE DEVICE | Status: FUNCTIONAL

## 2017-09-28 DEVICE — IMPLANTABLE DEVICE: Type: IMPLANTABLE DEVICE | Status: FUNCTIONAL

## 2017-09-28 DEVICE — BONE CEMENT SIMPLEX FULL DOSE - (10EA/PK): Type: IMPLANTABLE DEVICE | Status: FUNCTIONAL

## 2017-09-28 RX ORDER — MESALAMINE 0.38 G/1
4 CAPSULE, EXTENDED RELEASE ORAL DAILY
Status: DISCONTINUED | OUTPATIENT
Start: 2017-09-28 | End: 2017-09-28

## 2017-09-28 RX ORDER — KETOROLAC TROMETHAMINE 30 MG/ML
15 INJECTION, SOLUTION INTRAMUSCULAR; INTRAVENOUS EVERY 8 HOURS
Status: COMPLETED | OUTPATIENT
Start: 2017-09-28 | End: 2017-09-28

## 2017-09-28 RX ORDER — EPINEPHRINE 1 MG/ML(1)
AMPUL (ML) INJECTION
Status: DISCONTINUED | OUTPATIENT
Start: 2017-09-28 | End: 2017-09-28 | Stop reason: HOSPADM

## 2017-09-28 RX ORDER — OXYCODONE HYDROCHLORIDE 5 MG/1
5 TABLET ORAL
Status: DISCONTINUED | OUTPATIENT
Start: 2017-09-28 | End: 2017-09-29 | Stop reason: HOSPADM

## 2017-09-28 RX ORDER — CELECOXIB 200 MG/1
CAPSULE ORAL
Status: COMPLETED
Start: 2017-09-28 | End: 2017-09-28

## 2017-09-28 RX ORDER — AMOXICILLIN 250 MG
1 CAPSULE ORAL
Status: DISCONTINUED | OUTPATIENT
Start: 2017-09-28 | End: 2017-09-29 | Stop reason: HOSPADM

## 2017-09-28 RX ORDER — HYDROMORPHONE HYDROCHLORIDE 2 MG/ML
INJECTION, SOLUTION INTRAMUSCULAR; INTRAVENOUS; SUBCUTANEOUS
Status: COMPLETED
Start: 2017-09-28 | End: 2017-09-28

## 2017-09-28 RX ORDER — ONDANSETRON 2 MG/ML
INJECTION INTRAMUSCULAR; INTRAVENOUS
Status: COMPLETED
Start: 2017-09-28 | End: 2017-09-28

## 2017-09-28 RX ORDER — ATORVASTATIN CALCIUM 10 MG/1
10 TABLET, FILM COATED ORAL DAILY
Status: DISCONTINUED | OUTPATIENT
Start: 2017-09-29 | End: 2017-09-29 | Stop reason: HOSPADM

## 2017-09-28 RX ORDER — DEXTROSE MONOHYDRATE 25 G/50ML
25 INJECTION, SOLUTION INTRAVENOUS
Status: DISCONTINUED | OUTPATIENT
Start: 2017-09-28 | End: 2017-09-29 | Stop reason: HOSPADM

## 2017-09-28 RX ORDER — ZOLPIDEM TARTRATE 5 MG/1
5 TABLET ORAL NIGHTLY PRN
Status: DISCONTINUED | OUTPATIENT
Start: 2017-09-29 | End: 2017-09-29 | Stop reason: HOSPADM

## 2017-09-28 RX ORDER — OXYCODONE HYDROCHLORIDE 10 MG/1
10 TABLET ORAL
Status: DISCONTINUED | OUTPATIENT
Start: 2017-09-28 | End: 2017-09-29 | Stop reason: HOSPADM

## 2017-09-28 RX ORDER — BISACODYL 10 MG
10 SUPPOSITORY, RECTAL RECTAL
Status: DISCONTINUED | OUTPATIENT
Start: 2017-09-28 | End: 2017-09-29 | Stop reason: HOSPADM

## 2017-09-28 RX ORDER — TRAMADOL HYDROCHLORIDE 50 MG/1
50 TABLET ORAL EVERY 4 HOURS PRN
Status: DISCONTINUED | OUTPATIENT
Start: 2017-09-28 | End: 2017-09-29 | Stop reason: HOSPADM

## 2017-09-28 RX ORDER — AMLODIPINE BESYLATE 2.5 MG/1
2.5 TABLET ORAL DAILY
Status: DISCONTINUED | OUTPATIENT
Start: 2017-09-29 | End: 2017-09-29 | Stop reason: HOSPADM

## 2017-09-28 RX ORDER — MAGNESIUM HYDROXIDE 1200 MG/15ML
LIQUID ORAL
Status: DISCONTINUED | OUTPATIENT
Start: 2017-09-28 | End: 2017-09-28 | Stop reason: HOSPADM

## 2017-09-28 RX ORDER — ACETAMINOPHEN 500 MG
1000 TABLET ORAL EVERY 8 HOURS
Status: DISCONTINUED | OUTPATIENT
Start: 2017-09-28 | End: 2017-09-29 | Stop reason: HOSPADM

## 2017-09-28 RX ORDER — DOCUSATE SODIUM 100 MG/1
100 CAPSULE, LIQUID FILLED ORAL 2 TIMES DAILY
Status: DISCONTINUED | OUTPATIENT
Start: 2017-09-28 | End: 2017-09-29 | Stop reason: HOSPADM

## 2017-09-28 RX ORDER — LIDOCAINE AND PRILOCAINE 25; 25 MG/G; MG/G
1 CREAM TOPICAL
Status: COMPLETED | OUTPATIENT
Start: 2017-09-28 | End: 2017-09-28

## 2017-09-28 RX ORDER — SCOLOPAMINE TRANSDERMAL SYSTEM 1 MG/1
1 PATCH, EXTENDED RELEASE TRANSDERMAL
Status: DISCONTINUED | OUTPATIENT
Start: 2017-09-28 | End: 2017-09-29 | Stop reason: HOSPADM

## 2017-09-28 RX ORDER — POLYETHYLENE GLYCOL 3350 17 G/17G
1 POWDER, FOR SOLUTION ORAL 2 TIMES DAILY PRN
Status: DISCONTINUED | OUTPATIENT
Start: 2017-09-28 | End: 2017-09-29 | Stop reason: HOSPADM

## 2017-09-28 RX ORDER — ENEMA 19; 7 G/133ML; G/133ML
1 ENEMA RECTAL
Status: DISCONTINUED | OUTPATIENT
Start: 2017-09-28 | End: 2017-09-29 | Stop reason: HOSPADM

## 2017-09-28 RX ORDER — GABAPENTIN 300 MG/1
CAPSULE ORAL
Status: COMPLETED
Start: 2017-09-28 | End: 2017-09-28

## 2017-09-28 RX ORDER — ACETAMINOPHEN 500 MG
TABLET ORAL
Status: COMPLETED
Start: 2017-09-28 | End: 2017-09-28

## 2017-09-28 RX ORDER — AMLODIPINE BESYLATE 2.5 MG/1
2.5 TABLET ORAL DAILY
COMMUNITY
End: 2022-07-19

## 2017-09-28 RX ORDER — LIDOCAINE HYDROCHLORIDE 10 MG/ML
0.5 INJECTION, SOLUTION INFILTRATION; PERINEURAL
Status: COMPLETED | OUTPATIENT
Start: 2017-09-28 | End: 2017-09-28

## 2017-09-28 RX ORDER — ONDANSETRON 2 MG/ML
4 INJECTION INTRAMUSCULAR; INTRAVENOUS EVERY 4 HOURS PRN
Status: DISCONTINUED | OUTPATIENT
Start: 2017-09-28 | End: 2017-09-29 | Stop reason: HOSPADM

## 2017-09-28 RX ORDER — OXYCODONE HCL 5 MG/5 ML
SOLUTION, ORAL ORAL
Status: COMPLETED
Start: 2017-09-28 | End: 2017-09-28

## 2017-09-28 RX ORDER — LIDOCAINE HYDROCHLORIDE 10 MG/ML
INJECTION, SOLUTION INFILTRATION; PERINEURAL
Status: DISPENSED
Start: 2017-09-28 | End: 2017-09-28

## 2017-09-28 RX ORDER — DEXTROSE MONOHYDRATE, SODIUM CHLORIDE, AND POTASSIUM CHLORIDE 50; 1.49; 4.5 G/1000ML; G/1000ML; G/1000ML
INJECTION, SOLUTION INTRAVENOUS CONTINUOUS
Status: DISCONTINUED | OUTPATIENT
Start: 2017-09-28 | End: 2017-09-29 | Stop reason: HOSPADM

## 2017-09-28 RX ORDER — DIPHENHYDRAMINE HYDROCHLORIDE 50 MG/ML
25 INJECTION INTRAMUSCULAR; INTRAVENOUS EVERY 6 HOURS PRN
Status: DISCONTINUED | OUTPATIENT
Start: 2017-09-28 | End: 2017-09-29 | Stop reason: HOSPADM

## 2017-09-28 RX ORDER — CEFAZOLIN SODIUM 2 G/100ML
2 INJECTION, SOLUTION INTRAVENOUS ONCE
Status: ACTIVE | OUTPATIENT
Start: 2017-09-28 | End: 2017-09-29

## 2017-09-28 RX ORDER — CEFAZOLIN SODIUM 2 G/100ML
2 INJECTION, SOLUTION INTRAVENOUS EVERY 8 HOURS
Status: COMPLETED | OUTPATIENT
Start: 2017-09-28 | End: 2017-09-29

## 2017-09-28 RX ORDER — AMOXICILLIN 250 MG
1 CAPSULE ORAL NIGHTLY
Status: DISCONTINUED | OUTPATIENT
Start: 2017-09-28 | End: 2017-09-29 | Stop reason: HOSPADM

## 2017-09-28 RX ORDER — ALLOPURINOL 100 MG/1
100 TABLET ORAL DAILY
Status: DISCONTINUED | OUTPATIENT
Start: 2017-09-29 | End: 2017-09-29 | Stop reason: HOSPADM

## 2017-09-28 RX ORDER — TAMSULOSIN HYDROCHLORIDE 0.4 MG/1
0.8 CAPSULE ORAL
Status: DISCONTINUED | OUTPATIENT
Start: 2017-09-28 | End: 2017-09-29 | Stop reason: HOSPADM

## 2017-09-28 RX ORDER — CELECOXIB 200 MG/1
200 CAPSULE ORAL 2 TIMES DAILY WITH MEALS
Status: DISCONTINUED | OUTPATIENT
Start: 2017-09-29 | End: 2017-09-29 | Stop reason: HOSPADM

## 2017-09-28 RX ORDER — SODIUM CHLORIDE 9 MG/ML
INJECTION, SOLUTION INTRAVENOUS CONTINUOUS
Status: DISCONTINUED | OUTPATIENT
Start: 2017-09-28 | End: 2017-09-29 | Stop reason: HOSPADM

## 2017-09-28 RX ORDER — ACETAMINOPHEN 650 MG
TABLET, EXTENDED RELEASE ORAL
Status: DISCONTINUED | OUTPATIENT
Start: 2017-09-28 | End: 2017-09-28 | Stop reason: HOSPADM

## 2017-09-28 RX ADMIN — INFLUENZA A VIRUSA/MICHIGAN/45/2015 X-275 (H1N1) ANTIGEN (FORMALDEHYDE INACTIVATED), INFLUENZA A VIRUS A/HONG KONG/4801/2014 X-263B (H3N2) ANTIGEN (FORMALDEHYDE INACTIVATED), AND INFLUENZA B VIRUS B/BRISBANE/60/2008 ANTIGEN (FORMALDEHYDE INACTIVATED) 0.5 ML: 60; 60; 60 INJECTION, SUSPENSION INTRAMUSCULAR at 20:33

## 2017-09-28 RX ADMIN — KETOROLAC TROMETHAMINE 15 MG: 30 INJECTION, SOLUTION INTRAMUSCULAR at 17:40

## 2017-09-28 RX ADMIN — HYDROMORPHONE HYDROCHLORIDE 0.5 MG: 2 INJECTION INTRAMUSCULAR; INTRAVENOUS; SUBCUTANEOUS at 16:20

## 2017-09-28 RX ADMIN — OXYCODONE HYDROCHLORIDE 10 MG: 5 SOLUTION ORAL at 16:00

## 2017-09-28 RX ADMIN — FENTANYL CITRATE 50 MCG: 50 INJECTION, SOLUTION INTRAMUSCULAR; INTRAVENOUS at 15:50

## 2017-09-28 RX ADMIN — LIDOCAINE HYDROCHLORIDE 0.5 ML: 10 INJECTION, SOLUTION INFILTRATION; PERINEURAL at 11:25

## 2017-09-28 RX ADMIN — ACETAMINOPHEN 1000 MG: 500 TABLET ORAL at 17:40

## 2017-09-28 RX ADMIN — CEFAZOLIN SODIUM 2 G: 2 INJECTION, SOLUTION INTRAVENOUS at 20:32

## 2017-09-28 RX ADMIN — KETOROLAC TROMETHAMINE 15 MG: 30 INJECTION, SOLUTION INTRAMUSCULAR at 22:00

## 2017-09-28 RX ADMIN — GABAPENTIN 600 MG: 300 CAPSULE ORAL at 11:25

## 2017-09-28 RX ADMIN — TRAMADOL HYDROCHLORIDE 50 MG: 50 TABLET, COATED ORAL at 23:37

## 2017-09-28 RX ADMIN — SODIUM CHLORIDE: 9 INJECTION, SOLUTION INTRAVENOUS at 11:25

## 2017-09-28 RX ADMIN — TRANEXAMIC ACID 2000 MG: 100 INJECTION, SOLUTION INTRAVENOUS at 16:30

## 2017-09-28 RX ADMIN — TRAMADOL HYDROCHLORIDE 50 MG: 50 TABLET, COATED ORAL at 17:41

## 2017-09-28 RX ADMIN — ONDANSETRON 4 MG: 2 INJECTION INTRAMUSCULAR; INTRAVENOUS at 15:50

## 2017-09-28 RX ADMIN — TAMSULOSIN HYDROCHLORIDE 0.8 MG: 0.4 CAPSULE ORAL at 17:40

## 2017-09-28 RX ADMIN — FENTANYL CITRATE 50 MCG: 50 INJECTION, SOLUTION INTRAMUSCULAR; INTRAVENOUS at 15:45

## 2017-09-28 RX ADMIN — ACETAMINOPHEN 500 MG: 500 TABLET, FILM COATED ORAL at 11:25

## 2017-09-28 RX ADMIN — ACETAMINOPHEN 1000 MG: 500 TABLET ORAL at 23:37

## 2017-09-28 RX ADMIN — CELECOXIB 200 MG: 200 CAPSULE ORAL at 11:25

## 2017-09-28 RX ADMIN — OXYCODONE HYDROCHLORIDE 5 MG: 5 TABLET ORAL at 20:33

## 2017-09-28 RX ADMIN — STANDARDIZED SENNA CONCENTRATE AND DOCUSATE SODIUM 1 TABLET: 8.6; 5 TABLET, FILM COATED ORAL at 20:33

## 2017-09-28 RX ADMIN — DOCUSATE SODIUM 100 MG: 100 CAPSULE ORAL at 20:33

## 2017-09-28 ASSESSMENT — LIFESTYLE VARIABLES
TOTAL SCORE: 0
EVER HAD A DRINK FIRST THING IN THE MORNING TO STEADY YOUR NERVES TO GET RID OF A HANGOVER: NO
CONSUMPTION TOTAL: NEGATIVE
HOW MANY TIMES IN THE PAST YEAR HAVE YOU HAD 5 OR MORE DRINKS IN A DAY: 0
ALCOHOL_USE: YES
AVERAGE NUMBER OF DAYS PER WEEK YOU HAVE A DRINK CONTAINING ALCOHOL: 0
HAVE PEOPLE ANNOYED YOU BY CRITICIZING YOUR DRINKING: NO
ON A TYPICAL DAY WHEN YOU DRINK ALCOHOL HOW MANY DRINKS DO YOU HAVE: 1
EVER_SMOKED: NEVER
TOTAL SCORE: 0
HAVE YOU EVER FELT YOU SHOULD CUT DOWN ON YOUR DRINKING: NO
EVER FELT BAD OR GUILTY ABOUT YOUR DRINKING: NO
TOTAL SCORE: 0

## 2017-09-28 ASSESSMENT — PAIN SCALES - GENERAL
PAINLEVEL_OUTOF10: 4
PAINLEVEL_OUTOF10: 4
PAINLEVEL_OUTOF10: 1
PAINLEVEL_OUTOF10: 0
PAINLEVEL_OUTOF10: 9
PAINLEVEL_OUTOF10: 7

## 2017-09-28 NOTE — PROGRESS NOTES
The Medication Reconciliation process has been completed by interviewing the patient    Allergies have been reviewed  Antibiotic use in 30 days - none    Home Pharmacy:  [facility] [place] [address] [facility] [address]

## 2017-09-28 NOTE — PROGRESS NOTES
Received shift report from PACU Katelynn OTERO, and assumed care of this pt at 1730. Pt AOx4, calm. Pt reports pain is 7/10 - ica pack in place, medicated prn per MAR. Pt is up 2 assist +FWW, WBAT. Does call appropriately. Bed alarm is off per pt refusal. PIV assessed and is patent, CDI, SL. Pt is on 3L nc with SaO2 >90%. Pt states priority this shift is rest. Discussed POC for rest, mobility, medications, day time routine, comfort, and safety. Patient has call light and personal belongings within reach. Safety and fall precautions in place. Reviewed orders, notes, labs, and test results. Hourly rounding in place with RN rounding on odd hours and CNA on even hours.

## 2017-09-28 NOTE — PROCEDURES
PERIPHERAL NERVE BLOCK:   Start time: 1522  End time: 1523    Indication:   Postop Analgesia ONLY    Requested by:  Dr. Arias      Time out completed:  Yes      Laterality:    Right    Block:   Selective Femoral at Add. Canal    Solution:   15ml 0.5%  Bupivacaine w/ epi and 2mg Dexamethasone    Type:    Single injection    Localization:  U/S guidance & interpretation, nerve identified, perineural spread    Technique:  Sterile    Prep Solution: Alcohol    Aspiration of blood:  Negative    Parasthesia/Pain:  None    Performed while under general anesthesia:  Yes     Complications:  None    Additional Notes:          All Murphy M.D.  9/28/2017  3:18 PM

## 2017-09-29 VITALS
OXYGEN SATURATION: 93 % | WEIGHT: 252.65 LBS | HEART RATE: 80 BPM | TEMPERATURE: 97.8 F | RESPIRATION RATE: 16 BRPM | BODY MASS INDEX: 34.22 KG/M2 | HEIGHT: 72 IN | SYSTOLIC BLOOD PRESSURE: 102 MMHG | DIASTOLIC BLOOD PRESSURE: 66 MMHG

## 2017-09-29 LAB
GLUCOSE BLD-MCNC: 118 MG/DL (ref 65–99)
HCT VFR BLD AUTO: 37.1 % (ref 42–52)
HGB BLD-MCNC: 12.6 G/DL (ref 14–18)

## 2017-09-29 PROCEDURE — G8987 SELF CARE CURRENT STATUS: HCPCS | Mod: CI

## 2017-09-29 PROCEDURE — G8989 SELF CARE D/C STATUS: HCPCS | Mod: CI

## 2017-09-29 PROCEDURE — 700102 HCHG RX REV CODE 250 W/ 637 OVERRIDE(OP): Performed by: ORTHOPAEDIC SURGERY

## 2017-09-29 PROCEDURE — G8980 MOBILITY D/C STATUS: HCPCS | Mod: CJ

## 2017-09-29 PROCEDURE — 97161 PT EVAL LOW COMPLEX 20 MIN: CPT

## 2017-09-29 PROCEDURE — G8979 MOBILITY GOAL STATUS: HCPCS | Mod: CJ

## 2017-09-29 PROCEDURE — 700111 HCHG RX REV CODE 636 W/ 250 OVERRIDE (IP): Performed by: ORTHOPAEDIC SURGERY

## 2017-09-29 PROCEDURE — G8978 MOBILITY CURRENT STATUS: HCPCS | Mod: CJ

## 2017-09-29 PROCEDURE — 36415 COLL VENOUS BLD VENIPUNCTURE: CPT

## 2017-09-29 PROCEDURE — G8988 SELF CARE GOAL STATUS: HCPCS | Mod: CI

## 2017-09-29 PROCEDURE — A9270 NON-COVERED ITEM OR SERVICE: HCPCS | Performed by: ORTHOPAEDIC SURGERY

## 2017-09-29 PROCEDURE — 85018 HEMOGLOBIN: CPT

## 2017-09-29 PROCEDURE — 82962 GLUCOSE BLOOD TEST: CPT

## 2017-09-29 PROCEDURE — 85014 HEMATOCRIT: CPT

## 2017-09-29 PROCEDURE — 97165 OT EVAL LOW COMPLEX 30 MIN: CPT

## 2017-09-29 PROCEDURE — 700112 HCHG RX REV CODE 229: Performed by: ORTHOPAEDIC SURGERY

## 2017-09-29 RX ORDER — PSEUDOEPHEDRINE HCL 30 MG
100 TABLET ORAL 2 TIMES DAILY
Qty: 60 CAP | COMMUNITY
Start: 2017-09-29 | End: 2022-07-19

## 2017-09-29 RX ORDER — HYDROCODONE BITARTRATE AND ACETAMINOPHEN 5; 325 MG/1; MG/1
1-2 TABLET ORAL EVERY 4 HOURS PRN
Qty: 75 TAB | Refills: 0 | Status: SHIPPED | OUTPATIENT
Start: 2017-09-29 | End: 2017-09-29

## 2017-09-29 RX ORDER — HYDROCODONE BITARTRATE AND ACETAMINOPHEN 5; 325 MG/1; MG/1
1-2 TABLET ORAL EVERY 4 HOURS PRN
Qty: 75 TAB | Refills: 0 | Status: SHIPPED | OUTPATIENT
Start: 2017-09-29 | End: 2022-07-19

## 2017-09-29 RX ORDER — ASPIRIN 81 MG/1
81 TABLET ORAL 2 TIMES DAILY
Qty: 30 TAB | COMMUNITY
Start: 2017-09-29 | End: 2022-07-19

## 2017-09-29 RX ADMIN — TAMSULOSIN HYDROCHLORIDE 0.8 MG: 0.4 CAPSULE ORAL at 08:52

## 2017-09-29 RX ADMIN — DOCUSATE SODIUM 100 MG: 100 CAPSULE ORAL at 08:51

## 2017-09-29 RX ADMIN — ASPIRIN 81 MG: 81 TABLET, COATED ORAL at 02:46

## 2017-09-29 RX ADMIN — ALLOPURINOL 100 MG: 100 TABLET ORAL at 08:51

## 2017-09-29 RX ADMIN — TRAMADOL HYDROCHLORIDE 50 MG: 50 TABLET, COATED ORAL at 06:02

## 2017-09-29 RX ADMIN — CELECOXIB 200 MG: 200 CAPSULE ORAL at 08:51

## 2017-09-29 RX ADMIN — AMLODIPINE BESYLATE 2.5 MG: 2.5 TABLET ORAL at 08:54

## 2017-09-29 RX ADMIN — OXYCODONE HYDROCHLORIDE 5 MG: 5 TABLET ORAL at 02:46

## 2017-09-29 RX ADMIN — CEFAZOLIN SODIUM 2 G: 2 INJECTION, SOLUTION INTRAVENOUS at 06:02

## 2017-09-29 RX ADMIN — ACETAMINOPHEN 1000 MG: 500 TABLET ORAL at 06:02

## 2017-09-29 RX ADMIN — ATORVASTATIN CALCIUM 10 MG: 10 TABLET, FILM COATED ORAL at 08:52

## 2017-09-29 ASSESSMENT — COGNITIVE AND FUNCTIONAL STATUS - GENERAL
CLIMB 3 TO 5 STEPS WITH RAILING: A LITTLE
SUGGESTED CMS G CODE MODIFIER MOBILITY: CJ
MOVING FROM LYING ON BACK TO SITTING ON SIDE OF FLAT BED: A LITTLE
MOBILITY SCORE: 20
STANDING UP FROM CHAIR USING ARMS: A LITTLE
MOVING TO AND FROM BED TO CHAIR: A LITTLE
SUGGESTED CMS G CODE MODIFIER DAILY ACTIVITY: CI
HELP NEEDED FOR BATHING: A LITTLE
DAILY ACTIVITIY SCORE: 23

## 2017-09-29 ASSESSMENT — PAIN SCALES - GENERAL
PAINLEVEL_OUTOF10: 2
PAINLEVEL_OUTOF10: 5

## 2017-09-29 ASSESSMENT — ACTIVITIES OF DAILY LIVING (ADL): TOILETING: INDEPENDENT

## 2017-09-29 ASSESSMENT — GAIT ASSESSMENTS
GAIT LEVEL OF ASSIST: SUPERVISED
DEVIATION: ANTALGIC;OTHER (COMMENT)
ASSISTIVE DEVICE: FRONT WHEEL WALKER
DISTANCE (FEET): 150

## 2017-09-29 NOTE — THERAPY
"Physical Therapy Evaluation completed.   Bed Mobility:  Supine to Sit:  (up with OT; nt)  Transfers: Sit to Stand: Supervised  Gait: Level Of Assist: Supervised with Front-Wheel Walker       Plan of Care: Patient with no further skilled PT needs in the acute care setting at this time and Patient demonstrates safety with mobility in this environment at this time.   Discharge Recommendations: Equipment: No Equipment Needed. Post-acute therapy Discharge to home with outpatient or home health for additional skilled therapy services.    Pt presents to acute PT s/p R TKA and subsequent limited ROM, weakness, and impaired RLE motor control and gait endurance. Pt is limited in functional mobility requiring extra time for transfers and mobility but feels confident to d/c home with assist from family as necessary. Pt will no longer require skilled acute PT but may benefit from outpatient PT post d/c home.    See \"Rehab Therapy-Acute\" Patient Summary Report for complete documentation.     "

## 2017-09-29 NOTE — CARE PLAN
Received report from day RN, assumed care at 1915; Pt A&Ox4, sitting up on bed; Pt pain is 3-5/10 on his Rt knee; CMS intact, numbness noted; dressing CDI; Pt is up w SA assist, FWW; PIV Lt Wrist assessed and is patent, CDI, SL; Pt is on RA and 1L NC at night w SaO2 >90%; Call light and personal possessions within reach, discussed safety interventions w pt; Reviewed recent notes, labs, diagnostics, MD orders; POC discussed        Problem: Safety  Goal: Will remain free from injury  Outcome: PROGRESSING AS EXPECTED  Place pt call light and belongings within reached, pt calls approriately; Instructed to call for assistance, Risk for fall education implemented; Non-skid socks on when OOB; Bed lowered and locked, upper siderails up. Hourly rounding in place      Problem: Venous Thromboembolism (VTW)/Deep Vein Thrombosis (DVT) Prevention:  Goal: Patient will participate in Venous Thrombosis (VTE)/Deep Vein Thrombosis (DVT)Prevention Measures  Outcome: PROGRESSING AS EXPECTED  SCD's On. ASA sched      Problem: Pain Management  Goal: Pain level will decrease to patient's comfort goal  Outcome: PROGRESSING AS EXPECTED  Medicated per MAR. Explained pain mngt. Ice pack given. Pt pain is controlled and comfortable, will ctm      Problem: Mobility  Goal: Risk for activity intolerance will decrease  Outcome: PROGRESSING AS EXPECTED  Ambulating to bathroom. RN assisted      Encouraged C/DB exercise. IS educated. Manage to pull up 3000ml.       PT/OT ordered.

## 2017-09-29 NOTE — PROGRESS NOTES
S: doing well. Pain well controlled.     O:   Vitals:    09/28/17 2330 09/29/17 0330 09/29/17 0621 09/29/17 0756   BP: 106/65 111/70  102/66   Pulse: 74 68  80   Resp: 16 15  16   Temp: 36.3 °C (97.4 °F) 36 °C (96.8 °F)  36.6 °C (97.8 °F)   SpO2: 97% 98% 97% 93%   Weight:       Height:          RLE:  dressing c/d/i. SILT s/s/sp/dp/t, motor intact ta/ehl/gs. Foot wwp     No new imaging    Recent Labs      09/29/17   0215   HEMOGLOBIN  12.6*   HEMATOCRIT  37.1*       A/P:   72 y.o. Male s/p R TKA  - doing well   - WBAT  - PT/OT  - d/c home  - dvt ppx

## 2017-09-29 NOTE — DISCHARGE INSTRUCTIONS
Ok to weight bear as tolerated. Keep dressing on until followup appointment. Ok to shower with dressing in place.     For pain take Norco as needed. Aspirin for DVT prophylaxis. Colace to avoid constipation.    *Follow up with Dr. Arias at scheduled appointment  *Weight bearing as tolerated                     *Activity as tolerated  *Use assistive device for all activity  *Continue exercises provided by physical therapy  *Elevate leg as needed; Ok to have pillow under the ankle NO pillow under knee  *Ice as needed (20 minutes every 1-2 hours)  *No soaking of the incision; no baths, hot tubs, or swimming until cleared by doctor  *Aspirin 81 mg twice a day for blood clot prevention        *Take medications as prescribed by doctor  *Call doctor’s office with any questions or concerns     Discharge Instructions    Discharged to home by car with relative. Discharged via wheelchair, hospital escort: Yes.  Special equipment needed: Walker    Be sure to schedule a follow-up appointment with your primary care doctor or any specialists as instructed.     Discharge Plan:   Influenza Vaccine Indication: Indicated: 65 years and older  Influenza Vaccine Given - only chart on this line when given: Influenza Vaccine Given (See MAR)    I understand that a diet low in cholesterol, fat, and sodium is recommended for good health. Unless I have been given specific instructions below for another diet, I accept this instruction as my diet prescription.   Other diet: Diabetic    Special Instructions: Discharge instructions for the Orthopedic Patient    Follow up with Primary Care Physician within 2 weeks of discharge to home, regarding:  Review of medications and diagnostic testing.  Surveillance for medical complications.  Workup and treatment of osteoporosis, if appropriate.     -Is this a Joint Replacement patient? Yes Total Joint Knee Replacement Discharge Instructions    Pain  - The goal is to slowly wean off the prescription pain  medicine.  - Ice can be used for pain control.  20 minutes at a time is recommended, and never directly against your skin or incision.  - Most patients are off the pain pills by 3 weeks; others may require a low level of pain medications for many months.  If your pain continues to be severe, follow up with your physician.  Infection    Knee joint infections; occur in fewer than 2% of patients. The most common causes of infection following total knee replacement surgery are from bacteria that enter the bloodstream during dental procedures, urinary tract infections, or skin infections. These bacteria can lodge around your knee replacement and cause an infection.  - Keep the incision as clean and dry as possible.  - Always wash your hands before touching your incision.  - Skin infections tend to develop around 7-10 days after surgery; most can be treated with oral antibiotics.  - Dental Care should be delayed for 3 months after surgery, your surgeon recommends taking a dose of antibiotics 1 hour prior to any dental procedure. After 2 years, most surgeons recommend antibiotics only before an extensive procedure.  Ask your surgeon what he recommends.  - Signs and symptoms of infection can include:  low grade fever, redness, pain, swelling and drainage from your incision.  Notify your surgeon immediately if you develop any of these symptoms.  Other instructions  - Bowel habits - constipation is extremely common and is caused by a combination of anesthesia, lack of mobility and pain medicine.  Use stool softeners or laxatives if necessary. It is important not to ignore this problem, as bowel obstructions can be a serious complication after joint replacement surgery.  - Mood/Energy Level - Many patients experience a lack of energy and endurance for up to 2-3 months after surgery.  Some may also feel down and can even become depressed.  This is likely due to the postoperative anemia, change in activity level, lack of sleep,  pain medicine and just the emotional reaction to the surgery itself that is a big disruption in a person’s life.  This usually passes.  If symptoms persist, follow up with your primary physician.  - Returning to work - Your surgeon will give you more specific instructions. Depending on the type of activities you perform, it may be 6 to 8 weeks before you return to work.   Generally, if you work a sedentary job requiring little standing or walking, most patients may return within 2-6 weeks.  Manual labor jobs involving walking, lifting and standing may take longer. Your surgeon’s office can provide a release to part-time or light duty work early on in your recovery and progress you to full duty as able.    - Driving - If your left knee was replaced and you have an automatic transmission, you may be able to begin driving in a week or so, provided you are no longer taking narcotic pain medication. If your right knee was replaced, avoid driving for 6 to 8 weeks. Remember that your reflexes may not be as sharp as before your surgery. Ask your surgeon for specific instructions.   - Avoiding falls - A fall during the first few weeks after surgery can damage your new knee and may result in a need for further surgery.   throw rugs and tack down loose carpeting.  Be aware of floor hazards such as pets, small objects or uneven surfaces.    - [employer] Metal Detectors - The sensitivity of metal detectors varies and it is likely that your prosthesis will cause an alarm.  Inform the  of your artificial joint.  Diet  - Resume your normal diet as tolerated.  - It is important to achieve a healthy nutritional status by eating a well balanced diet on a regular basis.  - Your physician may recommend that you take iron and vitamin supplements.   - Continue to drink plenty of fluids.  Shower/Bathing  - You may shower as soon as you get home from the hospital unless otherwise instructed.  - Keep your incision out of  water.  To keep the incision dry when showering, cover it with a plastic bag or plastic wrap.  - Pat incision dry if it gets wet.  Don’t rub.  - Do not submerge in a bath until staples are out and the incision is completely healed. (Approximately 6-8 weeks)  Dressing Change:  Procedure (if recommended by your physician)  - Wash hands.  - Open all new dressing change materials.  - Remove old dressing and discard.  - Inspect incision for redness, increase in clear drainage, yellow/green drainage, odor and surrounding skin hot to touch.  -  ABD (large gauze) pad or “island dressing” by one corner and lay over the incision.  Be careful not to touch the inside of the dressing that will lay over the incision.  - Secure in place as instructed (Ace wrap or tape).    Swelling/Bruising    - Swelling can last from 3-6 months.  - Elevate your leg higher than your heart while reclining.   The first week you are home you should elevate your leg an equal amount of time, as you are active.    - Anti-inflammatory pills can be taken once you have stopped the blood thinners.  - The swelling is usually worse after you go home since you are upright for longer periods of time.  - Bruising is common and can involve the entire leg including the thigh, calf and even foot. Bruising often does not appear until after you arrive home and it can be quite dramatic- purple, black, and green.  The bruising you can see is not usually concerning and will subside without any treatment.      Blood Clot Prevention  Blood clots in the legs and the less common, but frightening, clots that travel to the lungs are a real focus of our preventative. Most patients are at standard risk for them, but those patients who are at higher risk include people who have had previous clots, a family history of clotting, smoking, diabetes, obesity, advanced age, use of estrogen and a sedentary lifestyle.    - Signs of blood clots in legs - Swelling in thigh, calf or  ankle that does not go down with elevation.  Pain, heat and tenderness in calf, back of calf or groin area.  NOTE: blood clots can occur in either leg.  - You have been receiving anticoagulant therapy (blood thinners) in the hospital and you may be instructed to continue at home depending on your risk factors.  - Your risk for developing a clot continues for up to 2-3 months after surgery.  You should avoid prolonged sitting and dehydration during that time (long air trips and car trips).  If you do take a trip during this time, please get up and move around every 1- 1.5 hours.  - If you are prescribed blood-thinning medication for home, follow instructions as directed. (Handouts provided if applicable).      Activity  Once home, you should continue to stay active. The key is to remember not to overdo it! While you can expect some good days and some bad days, you should notice a gradual improvement and a gradual increase in your endurance over the next 6 to 12 months. Exercise is a critical component of home care, particularly during the first few weeks after surgery.     - Normal activities of daily living You should be able to resume most within 3 to 6 weeks following surgery. Some pain with activity and at night is common for several weeks after surgery  -  Physical Therapy Exercises - Continue to do the exercises prescribed for at least two months after surgery. Riding a stationary bicycle can help maintain muscle tone and keep your knee flexible. Try to achieve the maximum degree of bending and extension possible. (handout provided by Therapist).  - Sexual Activity -. Your surgeon can tell you when it safe to resume sexual activity.    - Sleeping Positions - You can safely sleep on your back, on either side, or on your stomach.   - Other Activities - Walk as much as you like, but remember that walking is no substitute for the exercises your doctor and physical therapist will prescribe. Lower impact activities  are preferred.  If you have specific questions, consult your Surgeon.    When to Call the Doctor   Call the physician if:   - Fever over 100.5? F  - Increased pain, drainage, redness, odor or heat around the incision area  - Shaking chills  - Increased knee pain with activity and rest  - Increased pain in calf, tenderness or redness above or below the knee  - Increased swelling of calf, ankle, foot  - Sudden increased shortness of breath, sudden onset of chest pain, localized chest pain with coughing  - Incision opening  Or, if there are any questions or concerns about medications or care.       -Is this patient being discharged with medication to prevent blood clots?  Yes, Aspirin     Total Knee Replacement, Care After  Refer to this sheet in the next few weeks. These instructions provide you with information on caring for yourself after your procedure. Your health care provider also may give you specific instructions. Your treatment has been planned according to the most current medical practices, but problems sometimes occur. Call your health care provider if you have any problems or questions after your procedure.  HOME CARE INSTRUCTIONS   · See a physical therapist as directed by your health care provider.  · Take medicines only as directed by your health care provider.  · Avoid lifting or driving until you are instructed otherwise.  · If you have been sent home with a continuous passive motion machine, use it as directed by your health care provider.  SEEK MEDICAL CARE IF:  · You have difficulty breathing.  · You have drainage, redness, swelling, or pain at your incision site.  · You have a bad smell coming from your incision site.  · You have persistent bleeding from your incision site.  · Your incision breaks open after sutures (stitches) or staples have been removed.  · You have a fever.  SEEK IMMEDIATE MEDICAL CARE IF:   · You have a rash.  · You have pain or swelling in your calf or thigh.  · You have  shortness of breath or chest pain.  · Your range of motion in your knee is decreasing rather than increasing.  MAKE SURE YOU:   · Understand these instructions.  · Will watch your condition.  · Will get help right away if you are not doing well or get worse.     This information is not intended to replace advice given to you by your health care provider. Make sure you discuss any questions you have with your health care provider.     Document Released: 07/07/2006 Document Revised: 01/08/2016 Document Reviewed: 02/05/2013  Explain My Surgery Interactive Patient Education ©2016 [employer]    Acetaminophen; Hydrocodone tablets or capsules  What is this medicine?  ACETAMINOPHEN; HYDROCODONE (a set a SEJAL sammy fen; clara droe KOE done) is a pain reliever. It is used to treat mild to moderate pain.  This medicine may be used for other purposes; ask your health care provider or pharmacist if you have questions.  COMMON BRAND NAME(S): Anexsia, Bancap HC , Ceta-Plus, Co-Gesic, Comfortpak , Dolagesic, Dolorex Forte, DuoCet , Hydrocet , Hydrogesic, Lorcet HD, Lorcet Plus, Lorcet, Lortab, Margesic H, Maxidone, Norco, Polygesic, Stagesic, Vanacet, Vicodin ES, Vicodin HP, Vicodin, Xodol, Zydone  What should I tell my health care provider before I take this medicine?  They need to know if you have any of these conditions:  -brain tumor  -Crohn's disease, inflammatory bowel disease, or ulcerative colitis  -drink more than 3 alcohol-containing drinks per day  -drug abuse or addiction  -head injury  -heart or circulation problems  -kidney disease or problems going to the bathroom  -liver disease  -lung disease, asthma, or breathing problems  -an unusual or allergic reaction to acetaminophen, hydrocodone, other opioid analgesics, other medicines, foods, dyes, or preservatives  -pregnant or trying to get pregnant  -breast-feeding  How should I use this medicine?  Take this medicine by mouth. Swallow it with a full glass of water. Follow the  directions on the prescription label. If the medicine upsets your stomach, take the medicine with food or milk. Do not take more than you are told to take.  Talk to your pediatrician regarding the use of this medicine in children. This medicine is not approved for use in children.  Overdosage: If you think you have taken too much of this medicine contact a poison control center or emergency room at once.  NOTE: This medicine is only for you. Do not share this medicine with others.  What if I miss a dose?  If you miss a dose, take it as soon as you can. If it is almost time for your next dose, take only that dose. Do not take double or extra doses.  What may interact with this medicine?  -alcohol  -antihistamines  -isoniazid  -medicines for depression, anxiety, or psychotic disturbances  -medicines for sleep  -muscle relaxants  -naltrexone  -narcotic medicines (opiates) for pain  -phenobarbital  -ritonavir  -tramadol  This list may not describe all possible interactions. Give your health care provider a list of all the medicines, herbs, non-prescription drugs, or dietary supplements you use. Also tell them if you smoke, drink alcohol, or use illegal drugs. Some items may interact with your medicine.  What should I watch for while using this medicine?  Tell your doctor or health care professional if your pain does not go away, if it gets worse, or if you have new or a different type of pain. You may develop tolerance to the medicine. Tolerance means that you will need a higher dose of the medicine for pain relief. Tolerance is normal and is expected if you take the medicine for a long time.  Do not suddenly stop taking your medicine because you may develop a severe reaction. Your body becomes used to the medicine. This does NOT mean you are addicted. Addiction is a behavior related to getting and using a drug for a non-medical reason. If you have pain, you have a medical reason to take pain medicine. Your doctor will  tell you how much medicine to take. If your doctor wants you to stop the medicine, the dose will be slowly lowered over time to avoid any side effects.  You may get drowsy or dizzy when you first start taking the medicine or change doses. Do not drive, use machinery, or do anything that may be dangerous until you know how the medicine affects you. Stand or sit up slowly.  There are different types of narcotic medicines (opiates) for pain. If you take more than one type at the same time, you may have more side effects. Give your health care provider a list of all medicines you use. Your doctor will tell you how much medicine to take. Do not take more medicine than directed. Call emergency for help if you have problems breathing.  The medicine will cause constipation. Try to have a bowel movement at least every 2 to 3 days. If you do not have a bowel movement for 3 days, call your doctor or health care professional.  Too much acetaminophen can be very dangerous. Do not take Tylenol (acetaminophen) or medicines that contain acetaminophen with this medicine. Many non-prescription medicines contain acetaminophen. Always read the labels carefully.  What side effects may I notice from receiving this medicine?  Side effects that you should report to your doctor or health care professional as soon as possible:  -allergic reactions like skin rash, itching or hives, swelling of the face, lips, or tongue  -breathing problems  -confusion  -feeling faint or lightheaded, falls  -stomach pain  -yellowing of the eyes or skin  Side effects that usually do not require medical attention (report to your doctor or health care professional if they continue or are bothersome):  -nausea, vomiting  -stomach upset  This list may not describe all possible side effects. Call your doctor for medical advice about side effects. You may report side effects to FDA at 6-747-FDA-4278.  Where should I keep my medicine?  Keep out of the reach of  children. This medicine can be abused. Keep your medicine in a safe place to protect it from theft. Do not share this medicine with anyone. Selling or giving away this medicine is dangerous and against the law.  Store at room temperature between 15 and 30 degrees C (59 and 86 degrees F). Protect from light. Keep container tightly closed.   Throw away any unused medicine after the expiration date. Discard unused medicine and used packaging carefully. Pets and children can be harmed if they find used or lost packages.  NOTE: This sheet is a summary. It may not cover all possible information. If you have questions about this medicine, talk to your doctor, pharmacist, or health care provider.  © 2014, [employer]. (8/27/2012 1:04:52 PM)    Aspirin, ASA oral tablets  What is this medicine?  ASPIRIN (AS pir in) is a pain reliever. It is used to treat mild pain and fever. This medicine is also used as directed by a doctor to prevent and to treat heart attacks, to prevent strokes, and to treat arthritis or inflammation.  This medicine may be used for other purposes; ask your health care provider or pharmacist if you have questions.  COMMON BRAND NAME(S): Aspir-Low, Aspir-Marybeth , Aspirtab , Kwan Advanced Aspirin, [place]cl[place]ec Aspirin Extra Strength, Kwan Aspirin Plus, Kwan Aspirin, Kwan Genuine Aspirin, Kwan Womens Aspirin , Bufferin Extra Strength, Bufferin Low Dose, Bufferin  What should I tell my health care provider before I take this medicine?  They need to know if you have any of these conditions:  -anemia  -asthma  -bleeding problems  -child with chickenpox, the flu, or other viral infection  -diabetes  -gout  -if you frequently drink alcohol containing drinks  -kidney disease  -liver disease  -low level of vitamin K  -lupus  -smoke tobacco  -stomach ulcers or other problems  -an unusual or allergic reaction to aspirin, tartrazine dye, other medicines, dyes, or preservatives  -pregnant or trying to get  pregnant  -breast-feeding  How should I use this medicine?  Take this medicine by mouth with a glass of water. Follow the directions on the package or prescription label. You can take this medicine with or without food. If it upsets your stomach, take it with food. Do not take your medicine more often than directed.  Talk to your pediatrician regarding the use of this medicine in children. While this drug may be prescribed for children as young as 12 years of age for selected conditions, precautions do apply. Children and teenagers should not use this medicine to treat chicken pox or flu symptoms unless directed by a doctor.  Patients over 65 years old may have a stronger reaction and need a smaller dose.  Overdosage: If you think you have taken too much of this medicine contact a poison control center or emergency room at once.  NOTE: This medicine is only for you. Do not share this medicine with others.  What if I miss a dose?  If you are taking this medicine on a regular schedule and miss a dose, take it as soon as you can. If it is almost time for your next dose, take only that dose. Do not take double or extra doses.  What may interact with this medicine?  Do not take this medicine with any of the following medications:  -cidofovir  -ketorolac  -probenecid  This medicine may also interact with the following medications:  -alcohol  -alendronate  -bismuth subsalicylate  -flavocoxid  -herbal supplements like feverfew, garlic, ivan, ginkgo biloba, horse chestnut  -medicines for diabetes or glaucoma like acetazolamide, methazolamide  -medicines for gout  -medicines that treat or prevent blood clots like enoxaparin, heparin, ticlopidine, warfarin  -other aspirin and aspirin-like medicines  -NSAIDs, medicines for pain and inflammation, like ibuprofen or naproxen  -pemetrexed  -sulfinpyrazone  -varicella live vaccine  This list may not describe all possible interactions. Give your health care provider a list of all  the medicines, herbs, non-prescription drugs, or dietary supplements you use. Also tell them if you smoke, drink alcohol, or use illegal drugs. Some items may interact with your medicine.  What should I watch for while using this medicine?  If you are treating yourself for pain, tell your doctor or health care professional if the pain lasts more than 10 days, if it gets worse, or if there is a new or different kind of pain. Tell your doctor if you see redness or swelling. Also, check with your doctor if you have a fever that lasts for more than 3 days. Only take this medicine to prevent heart attacks or blood clotting if prescribed by your doctor or health care professional.  Do not take aspirin or aspirin-like medicines with this medicine. Too much aspirin can be dangerous. Always read the labels carefully.  This medicine can irritate your stomach or cause bleeding problems. Do not smoke cigarettes or drink alcohol while taking this medicine. Do not lie down for 30 minutes after taking this medicine to prevent irritation to your throat.  If you are scheduled for any medical or dental procedure, tell your healthcare provider that you are taking this medicine. You may need to stop taking this medicine before the procedure.  What side effects may I notice from receiving this medicine?  Side effects that you should report to your doctor or health care professional as soon as possible:  -allergic reactions like skin rash, itching or hives, swelling of the face, lips, or tongue  -black, tarry stools  -bloody, coffee ground-like vomit  -breathing problems  -changes in hearing, ringing in the ears  -confusion  -general ill feeling or flu-like symptoms  -pain on swallowing  -redness, blistering, peeling or loosening of the skin, including inside the mouth or nose  -trouble passing urine or change in the amount of urine  -unusual bleeding or bruising  -unusually weak or tired  -yellowing of the eyes or skin  Side effects  that usually do not require medical attention (report to your doctor or health care professional if they continue or are bothersome):  -diarrhea or constipation  -nausea, vomiting  -stomach gas, heartburn  This list may not describe all possible side effects. Call your doctor for medical advice about side effects. You may report side effects to FDA at 6-869-JAF-6644.  Where should I keep my medicine?  Keep out of the reach of children.  Store at room temperature between 15 and 30 degrees C (59 and 86 degrees F). Protect from heat and moisture. Do not use this medicine if it has a strong vinegar smell. Throw away any unused medicine after the expiration date.  NOTE: This sheet is a summary. It may not cover all possible information. If you have questions about this medicine, talk to your doctor, pharmacist, or health care provider.  © 2014, [employer]/Gold Standard. (3/10/2009 10:44:17 AM)      · Is patient discharged on Warfarin / Coumadin?   No     · Is patient Post Blood Transfusion?  No    Depression / Suicide Risk    As you are discharged from this [facility] facility, it is important to learn how to keep safe from harming yourself.    Recognize the warning signs:  · Abrupt changes in personality, positive or negative- including increase in energy   · Giving away possessions  · Change in eating patterns- significant weight changes-  positive or negative  · Change in sleeping patterns- unable to sleep or sleeping all the time   · Unwillingness or inability to communicate  · Depression  · Unusual sadness, discouragement and loneliness  · Talk of wanting to die  · Neglect of personal appearance   · Rebelliousness- reckless behavior  · Withdrawal from people/activities they love  · Confusion- inability to concentrate     If you or a loved one observes any of these behaviors or has concerns about self-harm, here's what you can do:  · Talk about it- your feelings and reasons for harming yourself  · Remove any means  that you might use to hurt yourself (examples: pills, rope, extension cords, firearm)  · Get professional help from the community (Mental Health, Substance Abuse, psychological counseling)  · Do not be alone:Call your Safe Contact- someone whom you trust who will be there for you.  · Call your local CRISIS HOTLINE 135-9929 or 556-216-2102  · Call your local Children's Mobile Crisis Response Team [place] (900) 182-4815 or www.Labmeeting  · Call the toll free National Suicide Prevention Hotlines   · National Suicide Prevention Lifeline 757-504-BUPX (9415)  · National Hope Line Network 800-SUICIDE (669-4440)

## 2017-09-29 NOTE — DISCHARGE SUMMARY
Darold Duane Mehlhaff was admitted on 9/28/2017. Underwent a right TKA by Dr. Arias  on the date of admission. Please see dictated operative note for further information.    Hospital course:     The patient has done well, with no complications.  Patient denies chest pain, calf pain or shortness of breath.   Pain is well-controlled at present.  Patient is ambulating well with the use of an assistive device, and progressing in physical therapy.   Patient is neurologically and vascularly intact with palpable pedal pulses bilaterally.      Discharge date: 09/29/17      Patient is being discharged to home after am physical therapy.     Allergies:  Review of patient's allergies indicates no known allergies.       Medication List      START taking these medications      Instructions   aspirin 81 MG EC tablet   Take 1 Tab by mouth 2 times a day.  Dose:  81 mg      MG Caps   Take 100 mg by mouth 2 times a day.  Dose:  100 mg     hydrocodone-acetaminophen 5-325 MG Tabs per tablet  Commonly known as:  NORCO   Take 1-2 Tabs by mouth every four hours as needed.  Dose:  1-2 Tab        CONTINUE taking these medications      Instructions   allopurinol 100 MG Tabs  Commonly known as:  ZYLOPRIM   Take 100 mg by mouth every day.  Dose:  100 mg     amlodipine 2.5 MG Tabs  Commonly known as:  NORVASC   Take 2.5 mg by mouth every day.  Dose:  2.5 mg     atorvastatin 10 MG Tabs  Commonly known as:  LIPITOR   Take 10 mg by mouth every day.  Dose:  10 mg     meloxicam 15 MG tablet  Commonly known as:  MOBIC   1 tab po qday for joint pain     Mesalamine 0.375 GM Cp24   Take 4 Caps by mouth every day.  Dose:  4 Cap     metformin 500 MG Tabs  Commonly known as:  GLUCOPHAGE   Take 500 mg by mouth every day.  Dose:  500 mg     tamsulosin 0.4 MG capsule  Commonly known as:  FLOMAX   Take 0.8 mg by mouth ONE-HALF HOUR AFTER BREAKFAST.  Dose:  0.8 mg     VITAMIN B 12 PO   Take  by mouth every day.     VITAMIN D PO   Take 1 Tab by mouth every  day.  Dose:  1 Tab            Discharge Instructions:     Patient is instructed to ambulate and WBAT with the use of an assistive device, and to continue physical therapy exercises given during this hospital stay. No precautions. Patient is to ice and elevate the surgical leg regularly, with pillows under the ankle, nothing is to be placed under the knee.   Patient was given detailed wound care instructions, and will leave the dressing on until first post-op visit.   ASA for DVT prophylaxis.  Patient is to follow up with Dr. Arias' office in 1-2 weeks.

## 2017-09-29 NOTE — THERAPY
"Occupational Therapy Evaluation completed.   Functional Status:  Up in chair, spv/ w/sit>stand, LB dressing grooming standing at sink, toilet txf and walking w/fww, no LOB no overt c/o pain or fatigue reports spouse can assist as needed   Plan of Care: Patient with no further skilled OT needs in the acute care setting at this time  Discharge Recommendations:  Equipment: No Equipment Needed. Post-acute therapy Currently anticipate no further skilled therapy needs once patient is discharged from the inpatient setting.    See \"Rehab Therapy-Acute\" Patient Summary Report for complete documentation.    "

## 2017-09-29 NOTE — DISCHARGE PLANNING
Medical Social Work    SW spoke with RN regarding pt. Anticipated D/C plan is home with no needs from case managment when medically cleared. Pt reports that he was already able to obtain FWW.

## 2017-09-29 NOTE — OP REPORT
DIAGNOSIS: right knee osteoarthritis.    PROCEDURE: Total knee arthroplasty, right side.    ANESTHESIA: General.    COMPLICATIONS: None.    SURGEON: Billy Arias MD    ASSISTANT: Kumar Lyon    INDICATIONS: This is a patient with severe pain secondary to osteoarthritis having failed conservative treatments.    DESCRIPTION OF PROCEDURE: Patient was identified in the preop area, site was   marked, taken back to the operating room and underwent general anesthesia.   The right lower extremity was prepped and draped in sterile manner.   Preoperative timeout was held and antibiotics were given. Incision was made   over the anterior knee, soft tissue was dissected down to the fascia. The   fascia was split in medial parapatellar approach. The step drill was placed,   cut was made at 6 degrees and then a femoral cut guide was placed. All cuts   were made for the 8. The tibia was cut and sized to the 7. The   patella was cut and sized to a 35 oval. All the trials were removed and then the   8 CR J2 S&N femur,  7 tibia and 35 oval patellar button were all cemented into  place. Final trialing showed that a 9 poly provided stability throughout   the entire arc of motion and 9 poly was placed. The wound was soaked with   dilute Betadine solution and was injected with Marcaine. Vicryl was used for   the fascia, Monocryl, soft tissue, skin and Dermabond for the final skin   layer. Patient was woken up, taken back to PACU, will be weightbear as   tolerated.

## 2017-09-29 NOTE — PROGRESS NOTES
Bedside report received from Tucker OTERO with Renaldo OTERO present and accepting care.  Pt a/a/o sitting in chair with no c/o at this time, room air, IV s/l, dressing to right knee intact and slightly elevated, fall precautions in place, pt calls for assist.  Patient has been ambulating with FWW and states she has one at home.

## 2017-09-29 NOTE — PROGRESS NOTES
Patient cleared for discharge home.  All discharge instructions reviewed with patient prior to discharge.  All questions and concerns addressed.  Scripts given per physician orders.  IV discontinued prior to discharge.  Dressing changed by rounding physician prior to discharge.  Patient has FWW and needed DME.  All follow up appointment information reviewed and confirmed.  Patient ambulating well with FWW.  Patient discharged home with family, escorted by wheelchair with all personal belongings.  States no c/o at this time and is stable on room air prior to discharge.

## 2017-10-03 ENCOUNTER — OFFICE VISIT (OUTPATIENT)
Dept: RHEUMATOLOGY | Facility: PHYSICIAN GROUP | Age: 72
End: 2017-10-03
Payer: MEDICARE

## 2017-10-03 VITALS
SYSTOLIC BLOOD PRESSURE: 120 MMHG | RESPIRATION RATE: 14 BRPM | DIASTOLIC BLOOD PRESSURE: 78 MMHG | OXYGEN SATURATION: 93 % | TEMPERATURE: 97.9 F | WEIGHT: 257 LBS | BODY MASS INDEX: 34.86 KG/M2 | HEART RATE: 80 BPM

## 2017-10-03 DIAGNOSIS — G47.30 SLEEP APNEA, UNSPECIFIED TYPE: ICD-10-CM

## 2017-10-03 DIAGNOSIS — M1A.09X0 IDIOPATHIC CHRONIC GOUT OF MULTIPLE SITES WITHOUT TOPHUS: ICD-10-CM

## 2017-10-03 DIAGNOSIS — K51.90 ULCERATIVE COLITIS WITHOUT COMPLICATIONS, UNSPECIFIED LOCATION (HCC): ICD-10-CM

## 2017-10-03 DIAGNOSIS — M17.11 PRIMARY OSTEOARTHRITIS OF RIGHT KNEE: ICD-10-CM

## 2017-10-03 DIAGNOSIS — Z96.653 H/O TOTAL KNEE REPLACEMENT, BILATERAL: ICD-10-CM

## 2017-10-03 PROCEDURE — 99214 OFFICE O/P EST MOD 30 MIN: CPT | Performed by: INTERNAL MEDICINE

## 2017-10-03 RX ORDER — ALLOPURINOL 100 MG/1
TABLET ORAL
Qty: 90 TAB | Refills: 0
Start: 2017-10-03 | End: 2018-04-03

## 2017-10-03 NOTE — PROGRESS NOTES
Chief Complaint- joint pain    Subjective:   Darold Duane Mehlhaff is a 72 y.o. male here today for follow up of rheumatological issues     This is a [facility] and DJD, djd started about 10 years, pt also with gout, last big flare 6 years ago got cortisone injections, patient hasn't had a gout flare since last visit, patient currently on allopurinol at 100 mg by mouth daily, At last visit patient with was thought to be an allopurinol 300 mg by mouth daily unclear as to now why patients on 100 mg by mouth daily other than possibly through a [facility] a [facility]. Since last visit patient now status post a right TKA within the last week, is scheduled to start physical therapy this week, is here having some soreness in that right knee.  Pt also with DM, no thyroid problems  No skin conditions, no anemia, no DVT/PE, no iritis/uveitis, no chronic recurrent infections, n organ problems  Pt takes aleve qday  Pt with sleep apnea, on CPAP  Pt with ulcerative colitis, pt takes Apriso/melasma mean and states that the UC is under very good control, denies achilles tendon inflammation, patient gets colonoscopies every 2 years to follow up on the ulcers colitis, last colonoscopy was about one year ago and was told that his ulcerative colitis is in remission.    Right TKA     Uric acid 6.0 8/2013 [facility]; Uric acid 8.4 elevated 3/2016; Uric acid 6.8 5/2017  RF neg 3/2016  CCP neg 3/2016  SI joints 3/2016- normal   DEXA 4/2016 T scores 2.3, 0.1          Current medicines (including changes today)  Current Outpatient Prescriptions   Medication Sig Dispense Refill   • allopurinol (ZYLOPRIM) 100 MG Tab 2 tabs po qday for one week then 3 tabs po qday 90 Tab 0   • aspirin EC 81 MG EC tablet Take 1 Tab by mouth 2 times a day. 30 Tab    • hydrocodone-acetaminophen (NORCO) 5-325 MG Tab per tablet Take 1-2 Tabs by mouth every four hours as needed. 75 Tab 0   • amlodipine (NORVASC) 2.5 MG Tab  Take 2.5 mg by mouth every day.     • tamsulosin (FLOMAX) 0.4 MG capsule Take 0.8 mg by mouth ONE-HALF HOUR AFTER BREAKFAST.     • Cyanocobalamin (VITAMIN B 12 PO) Take  by mouth every day.     • Cholecalciferol (VITAMIN D PO) Take 1 Tab by mouth every day.     • meloxicam (MOBIC) 15 MG tablet 1 tab po qday for joint pain 30 Tab 0   • atorvastatin (LIPITOR) 10 MG Tab Take 10 mg by mouth every day.     • metformin (GLUCOPHAGE) 500 MG Tab Take 500 mg by mouth every day.     • Mesalamine 0.375 GM CAPSULE SR 24 HR Take 4 Caps by mouth every day.     • docusate sodium 100 MG Cap Take 100 mg by mouth 2 times a day. 60 Cap      No current facility-administered medications for this visit.      He  has a past medical history of Arthritis (09/14/2017); Diabetes (CMS-[facility]); High cholesterol; Hypertension; Pain; Renal disorder; Sleep apnea; and Snoring.    ROS   Other than what is mentioned in HPI or physical exam, there is no history of headaches, double vision or blurred vision. No temporal tenderness or jaw claudication. No history of cataracts or glaucoma. No trouble swallowing difficulties or sore throats. No history of thyroid disease. No chest complaints including chest pain, cough or sputum production. No shortness of breath. No GI complaints including nausea, vomiting, change in bowel habits, or past peptic ulcer disease. No history of blood in the stools. No urinary complaints including dysuria or frequency. No history of rash including psoriasis. No history of alopecia, photosensitivity, oral ulcerations, Raynaud's phenomena, or swollen joints. No history of gout. No back complaints. No history of low blood counts.       Objective:     Blood pressure 120/78, pulse 80, temperature 36.6 °C (97.9 °F), resp. rate 14, weight 116.6 kg (257 lb), SpO2 93 %. Body mass index is 34.86 kg/m².   Physical Exam:  Constitutional: Alert and oriented X3, no distress.Skin: Warm, dry, good turgor, no rashes in visible areas, no evidence  of psoriatic plaques, no discoid lesions, no malar rashes.Eye: Equal, round and reactive, conjunctiva clear, lids normal EOM intactENMT: Lips without lesions, good dentition, no oropharyngeal ulcers, moist buccal mucosa, pinna without deformityNeck: Trachea midline, no masses, no thyromegaly.Lymph:  No cervical lymphadenopathy, no axillary lymphadenopathy, no supraclavicular lymphadenopathyRespiratory: Unlabored respiratory effort, lungs clear to auscultation, no wheezes, no ronchi.Cardiovascular: Normal S1, S2, no murmur, no edema.Abdomen: Soft, non-tender, no masses, no hepatosplenomegaly, there is some mild central obesityPsych: Alert and oriented x3, normal affect and mood.Neuro Cranial nerves 2-12 are grossly intact, no loss of sensation LEExt:no joint laxity noted in bilateral arms, no joint laxity noted in bilateral legs, no swan-neck or boutonniere deformities, no sausage digits, Heberden's node noted on the left index DIP joint, no ulnar deviation, no ulnar styloid process enlargement, no flexure contractures, no nodules no tophi, shoulders full range of motion, knees with crepitus but without effusions, no Achilles tendon inflammation, there is mild pes planus bilateral feet, no evidence of sausage digits noted neither on fingers and her toes, no sclerodactyly,no josselyn ungal erythema, mild tenderness to palpation along the right index finger MCP joint, no effusions, no redness, no warmth, patient now status post right TKA with well-healing surgical incision site, no evidence of infection, no purulent discharge,, no erythema, no lymphangitis  Lab Results   Component Value Date/Time    SODIUM 138 09/14/2017 10:11 AM    POTASSIUM 4.4 09/14/2017 10:11 AM    CHLORIDE 105 09/14/2017 10:11 AM    CO2 26 09/14/2017 10:11 AM    GLUCOSE 126 (H) 09/14/2017 10:11 AM    BUN 14 09/14/2017 10:11 AM    CREATININE 0.94 09/14/2017 10:11 AM      Lab Results   Component Value Date/Time    WBC 4.7 (L) 09/14/2017 10:11 AM    RBC  4.65 (L) 09/14/2017 10:11 AM    HEMOGLOBIN 12.6 (L) 09/29/2017 02:15 AM    HEMATOCRIT 37.1 (L) 09/29/2017 02:15 AM    MCV 95.7 09/14/2017 10:11 AM    MCH 32.7 09/14/2017 10:11 AM    MCHC 34.2 09/14/2017 10:11 AM    MPV 9.6 09/14/2017 10:11 AM    NEUTSPOLYS 59.90 09/14/2017 10:11 AM    LYMPHOCYTES 30.30 09/14/2017 10:11 AM    MONOCYTES 7.50 09/14/2017 10:11 AM    EOSINOPHILS 1.70 09/14/2017 10:11 AM    BASOPHILS 0.40 09/14/2017 10:11 AM      Lab Results   Component Value Date/Time    CALCIUM 9.5 09/14/2017 10:11 AM    ASTSGOT 22 08/05/2017 12:00 AM    ALTSGPT 25 08/05/2017 12:00 AM    ALKPHOSPHAT 50 08/05/2017 12:00 AM    TBILIRUBIN 0.7 08/05/2017 12:00 AM    ALBUMIN 4.0 08/05/2017 12:00 AM    TOTPROTEIN 6.7 08/05/2017 12:00 AM     Lab Results   Component Value Date/Time    URICACID 6.8 05/22/2017 09:04 AM    RHEUMFACTN <10 03/16/2016 08:34 AM    CCPANTIBODY 3 03/16/2016 08:34 AM     Lab Results   Component Value Date/Time    SEDRATEWES 2 11/21/2016 03:21 PM     Lab Results   Component Value Date/Time    HBA1C 5.9 (H) 05/22/2017 09:04 AM     Results for orders placed during the hospital encounter of 04/01/16   DS-BONE DENSITY STUDY (DEXA)    Impression According to the World Health Organization classification, bone mineral density of this patient is normal.        10-year Probability of Fracture:  Major Osteoporotic     4.1%  Hip     0.3%  Population      [place] ()    Based on left femur neck BMD          INTERPRETING LOCATION:  [address] [place] [address], [id number]     Results for orders placed during the hospital encounter of 06/14/17   DX-KNEES-AP BILATERAL STANDING    Impression Osteoarthropathy primarily involving the medial compartments, right greater than left.     Results for orders placed during the hospital encounter of 06/14/17   DX-KNEES-AP BILATERAL STANDING    Impression Osteoarthropathy primarily involving the medial compartments, right greater than left.     Results for orders placed during  the hospital encounter of 03/16/16   DX-SACROILIAC JOINTS 3+    Impression Negative exam of the sacroiliac joints.     Results for orders placed during the hospital encounter of 06/14/17   MR-KNEE-W/O RIGHT    Impression 1.  Severe osteoarthritis with extensive full-thickness articular cartilage loss in the medial compartment.    2.  Complex degenerative tear in the posterior horn and body of the medial meniscus.    3.  Complex tear in the posterior horn of the lateral meniscus near the meniscal root.    4.  Mucoid degeneration of the anterior cruciate ligament.    5.  Tendinopathy of the popliteus tendon.    6.  Lobulated low T1, high T2 signal lesion in the medial femoral condyle is consistent with a benign cartilaginous lesion.    7.  Small knee effusion with synovitis.    8.  Moderate size Baker cyst.     Assessment and Plan:     1. Idiopathic chronic gout of multiple sites without tophus  Uric acid a little bit elevated at 6.8, patient only on allopurinol 100 mg by mouth daily, we will titer allopurinol up to 300 mg by mouth daily over the course of the next 2 weeks. We will recheck uric acid level at next visit.  - allopurinol (ZYLOPRIM) 100 MG Tab; 2 tabs po qday for one week then 3 tabs po qday  Dispense: 90 Tab; Refill: 0    2. Ulcerative colitis without complications, unspecified location (CMS-[facility])  [facility]oscopy about one year ago indicating ulcerative colitis in remission.  Patient currently on mesalamine    3. H/O total knee replacement, bilateral  Just recently within the last week, starting physical therapy rehabilitation this week    4. Primary osteoarthritis of right knee  Status post TKA right knee this week    5. Sleep apnea, unspecified type  Has a CPAP machine at home which he does not use    Followup: Return in about 6 months (around 4/3/2018). or sooner prn    Patient was seen 30 minutes face-to-face of which more than 50% of the time was spent  counseling the patient (excluding time for procedures)  regarding  rheumatological condition and care. Therapy was discussed in detail.    Please note that this dictation was created using voice recognition software. I have made every reasonable attempt to correct obvious errors, but I expect that there are errors of grammar and possibly content that I did not discover before finalizing the note.

## 2018-04-03 ENCOUNTER — OFFICE VISIT (OUTPATIENT)
Dept: RHEUMATOLOGY | Facility: PHYSICIAN GROUP | Age: 73
End: 2018-04-03
Payer: MEDICARE

## 2018-04-03 VITALS
SYSTOLIC BLOOD PRESSURE: 126 MMHG | OXYGEN SATURATION: 95 % | RESPIRATION RATE: 16 BRPM | WEIGHT: 259.6 LBS | TEMPERATURE: 98 F | HEIGHT: 72 IN | HEART RATE: 74 BPM | BODY MASS INDEX: 35.16 KG/M2 | DIASTOLIC BLOOD PRESSURE: 74 MMHG

## 2018-04-03 DIAGNOSIS — M79.641 RIGHT HAND PAIN: ICD-10-CM

## 2018-04-03 DIAGNOSIS — G47.30 SLEEP APNEA, UNSPECIFIED TYPE: ICD-10-CM

## 2018-04-03 DIAGNOSIS — G89.29 CHRONIC BILATERAL LOW BACK PAIN WITHOUT SCIATICA: ICD-10-CM

## 2018-04-03 DIAGNOSIS — M54.50 CHRONIC BILATERAL LOW BACK PAIN WITHOUT SCIATICA: ICD-10-CM

## 2018-04-03 DIAGNOSIS — K51.90 ULCERATIVE COLITIS WITHOUT COMPLICATIONS, UNSPECIFIED LOCATION (HCC): ICD-10-CM

## 2018-04-03 DIAGNOSIS — R25.2 SPASM: ICD-10-CM

## 2018-04-03 DIAGNOSIS — M1A.09X0 IDIOPATHIC CHRONIC GOUT OF MULTIPLE SITES WITHOUT TOPHUS: ICD-10-CM

## 2018-04-03 PROCEDURE — 99214 OFFICE O/P EST MOD 30 MIN: CPT | Mod: 25 | Performed by: INTERNAL MEDICINE

## 2018-04-03 PROCEDURE — 20600 DRAIN/INJ JOINT/BURSA W/O US: CPT | Performed by: INTERNAL MEDICINE

## 2018-04-03 RX ORDER — ALLOPURINOL 300 MG/1
300 TABLET ORAL DAILY
Qty: 90 TAB | Refills: 0 | Status: SHIPPED | OUTPATIENT
Start: 2018-04-03 | End: 2018-10-03 | Stop reason: SDUPTHER

## 2018-04-03 RX ORDER — TIZANIDINE HYDROCHLORIDE 2 MG/1
CAPSULE, GELATIN COATED ORAL
Qty: 60 CAP | Refills: 0 | Status: SHIPPED | OUTPATIENT
Start: 2018-04-03 | End: 2018-10-03 | Stop reason: SDUPTHER

## 2018-04-03 RX ORDER — METHYLPREDNISOLONE ACETATE 40 MG/ML
40 INJECTION, SUSPENSION INTRA-ARTICULAR; INTRALESIONAL; INTRAMUSCULAR; SOFT TISSUE ONCE
Status: COMPLETED | OUTPATIENT
Start: 2018-04-03 | End: 2018-04-03

## 2018-04-03 RX ADMIN — METHYLPREDNISOLONE ACETATE 5 MG: 40 INJECTION, SUSPENSION INTRA-ARTICULAR; INTRALESIONAL; INTRAMUSCULAR; SOFT TISSUE at 10:17

## 2018-04-03 NOTE — LETTER
[facility]   [address], [address]  [place] [address]  Phone: [phone]  Fax: [phone]              Encounter Date: 4/3/2018    Dear Dr. De La Rosa ref. provider found,    It was a pleasure seeing your patient, Darold Duane Mehlhaff, on 4/3/2018. Diagnoses of Idiopathic chronic gout of multiple sites without tophus, Right hand pain, Chronic bilateral low back pain without sciatica, Spasm, Sleep apnea, unspecified type, and Ulcerative colitis without complications, unspecified location (CMS-[facility]) were pertinent to this visit.     Please find attached progress note which includes the history I obtained from Mr. Ho, my physical examination findings, my impression and recommendations.      Once again, it was a pleasure participating in your patient's care.  Please feel free to contact me if you have any questions or if I can be of any further assistance to your patients.      Sincerely,    Sabrina Pino M.D.  Electronically Signed          PROGRESS NOTE:  No notes on file

## 2018-04-04 NOTE — PROGRESS NOTES
Chief Complaint- joint pain    Subjective:   Darold Duane Mehlhaff is a 72 y.o. male here today for follow up of rheumatological issues    This is a follow-up visit for this  [facility] pt referred here for gout and DJD, djd started about 10 years, pt also with gout, patient states it's been years since he has had a gout flare, currently on allopurinol 300 mg by mouth daily. Patient denies any side effects from medication, no GI upset, no rashes, no neuropathy. No skin conditions, no anemia, no DVT/PE, no iritis/uveitis, no chronic recurrent infections, no organ problems, denies achilles tendon inflammation.    Patient here today complaining of pain in his right index MCP joints and wonders about getting a cortisone injection in that particular joint today. Patient denies any trauma, denies any falls, denies any swelling of that joint    Additional comorbidities include a right TKA within the last week.  Pt also with DM, and also has sleep apnea currently uses a CPAP machine. Pt also has  ulcerative colitis, pt takes Apriso/melasma and states that the UC is under very good control.  Patient gets colonoscopies every 2 years to follow up on the ulcers colitis.        Right TKA     Uric acid 6.0 8/2013 [facility]; Uric acid 8.4 elevated 3/2016; Uric acid 6.8 5/2017  RF neg 3/2016  CCP neg 3/2016  SI joints 3/2016- normal   DEXA 4/1/2016 T scores 2.3, 0.1    FRAX 4/1/2016 major osteoporotic fracture risk 4.1%, hip fracture risk 0.3%      Current medicines (including changes today)  Current Outpatient Prescriptions   Medication Sig Dispense Refill   • tizanidine (ZANAFLEX) 2 MG capsule 1-2 tabs po qhs for back spasm 60 Cap 0   • allopurinol (ZYLOPRIM) 300 MG Tab Take 1 Tab by mouth every day. 90 Tab 0   • aspirin EC 81 MG EC tablet Take 1 Tab by mouth 2 times a day. 30 Tab    • amlodipine (NORVASC) 2.5 MG Tab Take 2.5 mg by mouth every day.     • tamsulosin (FLOMAX) 0.4 MG capsule Take 0.8 mg by mouth ONE-HALF HOUR AFTER BREAKFAST.      • Cyanocobalamin (VITAMIN B 12 PO) Take  by mouth every day.     • Cholecalciferol (VITAMIN D PO) Take 1 Tab by mouth every day.     • meloxicam (MOBIC) 15 MG tablet 1 tab po qday for joint pain 30 Tab 0   • atorvastatin (LIPITOR) 10 MG Tab Take 10 mg by mouth every day.     • metformin (GLUCOPHAGE) 500 MG Tab Take 500 mg by mouth every day.     • Mesalamine 0.375 GM CAPSULE SR 24 HR Take 4 Caps by mouth every day.     • docusate sodium 100 MG Cap Take 100 mg by mouth 2 times a day. 60 Cap    • hydrocodone-acetaminophen (NORCO) 5-325 MG Tab per tablet Take 1-2 Tabs by mouth every four hours as needed. 75 Tab 0     No current facility-administered medications for this visit.      He  has a past medical history of Arthritis (09/14/2017); Diabetes (CMS-[facility]); High cholesterol; Hypertension; Pain; Renal disorder; Sleep apnea; and Snoring.    ROS   Other than what is mentioned in HPI or physical exam, there is no history of headaches, double vision or blurred vision. No temporal tenderness or jaw claudication. No history of cataracts or glaucoma. No trouble swallowing difficulties or sore throats. No history of thyroid disease. No chest complaints including chest pain, cough or sputum production. No shortness of breath. No GI complaints including nausea, vomiting, change in bowel habits, or past peptic ulcer disease. No history of blood in the stools. No urinary complaints including dysuria or frequency. No history of rash including psoriasis. No history of alopecia, photosensitivity, oral ulcerations, Raynaud's phenomena, or swollen joints. No history of gout. No back complaints. No history of low blood counts.       Objective:     Blood pressure 126/74, pulse 74, temperature 36.7 °C (98 °F), resp. rate 16, height 1.829 m (6'), weight 117.8 kg (259 lb 9.6 oz), SpO2 95 %. Body mass index is 35.21 kg/m².   Physical Exam:  Constitutional: Alert and oriented X3, no distress.Skin: Warm, dry, good turgor, no rashes in  visible areas, no evidence of psoriatic plaques, no discoid lesions, no malar rashes.Eye: Equal, round and reactive, conjunctiva clear, lids normal EOM intactENMT: Lips without lesions, good dentition, no oropharyngeal ulcers, moist buccal mucosa, pinna without deformityNeck: Trachea midline, no masses, no thyromegaly.Lymph:  No cervical lymphadenopathy, no axillary lymphadenopathy, no supraclavicular lymphadenopathyRespiratory: Unlabored respiratory effort, lungs clear to auscultation, no wheezes, no ronchi.Cardiovascular: Normal S1, S2, no murmur, no edema.Abdomen: Soft, non-tender, no masses, no hepatosplenomegaly, there is some mild central obesityPsych: Alert and oriented x3, normal affect and mood.Neuro Cranial nerves 2-12 are grossly intact, no loss of sensation LEExt:no joint laxity noted in bilateral arms, no joint laxity noted in bilateral legs, no swan-neck or boutonniere deformities, no sausage digits, Heberden's node noted on the left index DIP joint, no ulnar deviation, no ulnar styloid process enlargement, no flexure contractures, no nodules no tophi, shoulders full range of motion, knees with crepitus but without effusions, no Achilles tendon inflammation, there is mild pes planus bilateral feet, no evidence of sausage digits noted neither on fingers and her toes, no sclerodactyly,no josselyn ungal erythema, mild tenderness to palpation along the right index finger MCP joint, no effusions, no redness, no warmth, patient now status post right TKA with well-healing surgical incision site, no evidence of infection, no purulent discharge,, no erythema, no lymphangitis    Lab Results   Component Value Date/Time    SODIUM 138 09/14/2017 10:11 AM    POTASSIUM 4.4 09/14/2017 10:11 AM    CHLORIDE 105 09/14/2017 10:11 AM    CO2 26 09/14/2017 10:11 AM    GLUCOSE 126 (H) 09/14/2017 10:11 AM    BUN 14 09/14/2017 10:11 AM    CREATININE 0.94 09/14/2017 10:11 AM      Lab Results   Component Value Date/Time    WBC 4.7  (L) 09/14/2017 10:11 AM    RBC 4.65 (L) 09/14/2017 10:11 AM    HEMOGLOBIN 12.6 (L) 09/29/2017 02:15 AM    HEMATOCRIT 37.1 (L) 09/29/2017 02:15 AM    MCV 95.7 09/14/2017 10:11 AM    MCH 32.7 09/14/2017 10:11 AM    MCHC 34.2 09/14/2017 10:11 AM    MPV 9.6 09/14/2017 10:11 AM    NEUTSPOLYS 59.90 09/14/2017 10:11 AM    LYMPHOCYTES 30.30 09/14/2017 10:11 AM    MONOCYTES 7.50 09/14/2017 10:11 AM    EOSINOPHILS 1.70 09/14/2017 10:11 AM    BASOPHILS 0.40 09/14/2017 10:11 AM      Lab Results   Component Value Date/Time    CALCIUM 9.5 09/14/2017 10:11 AM    ASTSGOT 22 08/05/2017 12:00 AM    ALTSGPT 25 08/05/2017 12:00 AM    ALKPHOSPHAT 50 08/05/2017 12:00 AM    TBILIRUBIN 0.7 08/05/2017 12:00 AM    ALBUMIN 4.0 08/05/2017 12:00 AM    TOTPROTEIN 6.7 08/05/2017 12:00 AM     Lab Results   Component Value Date/Time    URICACID 6.8 05/22/2017 09:04 AM    RHEUMFACTN <10 03/16/2016 08:34 AM    CCPANTIBODY 3 03/16/2016 08:34 AM     Lab Results   Component Value Date/Time    SEDRATEWES 2 11/21/2016 03:21 PM     Lab Results   Component Value Date/Time    HBA1C 5.9 (H) 05/22/2017 09:04 AM     Results for orders placed during the hospital encounter of 04/01/16   DS-BONE DENSITY STUDY (DEXA)    Impression According to the World Health Organization classification, bone mineral density of this patient is normal.        10-year Probability of Fracture:  Major Osteoporotic     4.1%  Hip     0.3%  Population      [place] ()    Based on left femur neck BMD          INTERPRETING LOCATION:  [address] [place] [address], [place], [address]     DX-KNEES-AP BILATERAL STANDING    Impression Osteoarthropathy primarily involving the medial compartments, right greater than left.     Results for orders placed during the hospital encounter of 06/14/17   DX-KNEES-AP BILATERAL STANDING    Impression Osteoarthropathy primarily involving the medial compartments, right greater than left.     Results for orders placed during the hospital encounter of 03/16/16    DX-SACROILIAC JOINTS 3+    Impression Negative exam of the sacroiliac joints.     Results for orders placed during the hospital encounter of 06/14/17   MR-KNEE-W/O RIGHT    Impression 1.  Severe osteoarthritis with extensive full-thickness articular cartilage loss in the medial compartment.    2.  Complex degenerative tear in the posterior horn and body of the medial meniscus.    3.  Complex tear in the posterior horn of the lateral meniscus near the meniscal root.    4.  Mucoid degeneration of the anterior cruciate ligament.    5.  Tendinopathy of the popliteus tendon.    6.  Lobulated low T1, high T2 signal lesion in the medial femoral condyle is consistent with a benign cartilaginous lesion.    7.  Small knee effusion with synovitis.    8.  Moderate size Baker cyst.     Assessment and Plan:     1. Idiopathic chronic gout of multiple sites without tophus  Doing really quite well with all appear at all at 300 mg by mouth daily, today will check CBC, comprehensive panel and uric acid level to assure stability  - tizanidine (ZANAFLEX) 2 MG capsule; 1-2 tabs po qhs for back spasm  Dispense: 60 Cap; Refill: 0  - methylPREDNISolone acetate (DEPO-MEDROL) injection 40 mg; 1 mL by Intra-articular route Once.  - CBC WITH DIFFERENTIAL; Future  - COMP METABOLIC PANEL; Future  - URIC ACID; Future  - allopurinol (ZYLOPRIM) 300 MG Tab; Take 1 Tab by mouth every day.  Dispense: 90 Tab; Refill: 0    2. Right hand pain  Especially in that right index MCP joint, today will do a cortisone injection  - tizanidine (ZANAFLEX) 2 MG capsule; 1-2 tabs po qhs for back spasm  Dispense: 60 Cap; Refill: 0  - methylPREDNISolone acetate (DEPO-MEDROL) injection 40 mg; 1 mL by Intra-articular route Once.  - CBC WITH DIFFERENTIAL; Future  - COMP METABOLIC PANEL; Future  - URIC ACID; Future  - allopurinol (ZYLOPRIM) 300 MG Tab; Take 1 Tab by mouth every day.  Dispense: 90 Tab; Refill: 0    3. Chronic bilateral low back pain without  sciatica  Patient has used a muscle relaxer in the past with good results we will refill tizanidine today  - tizanidine (ZANAFLEX) 2 MG capsule; 1-2 tabs po qhs for back spasm  Dispense: 60 Cap; Refill: 0  - methylPREDNISolone acetate (DEPO-MEDROL) injection 40 mg; 1 mL by Intra-articular route Once.  - CBC WITH DIFFERENTIAL; Future  - COMP METABOLIC PANEL; Future  - URIC ACID; Future  - allopurinol (ZYLOPRIM) 300 MG Tab; Take 1 Tab by mouth every day.  Dispense: 90 Tab; Refill: 0    4. Spasm  We will refill tizanidine today  - tizanidine (ZANAFLEX) 2 MG capsule; 1-2 tabs po qhs for back spasm  Dispense: 60 Cap; Refill: 0  - methylPREDNISolone acetate (DEPO-MEDROL) injection 40 mg; 1 mL by Intra-articular route Once.  - CBC WITH DIFFERENTIAL; Future  - COMP METABOLIC PANEL; Future  - URIC ACID; Future  - allopurinol (ZYLOPRIM) 300 MG Tab; Take 1 Tab by mouth every day.  Dispense: 90 Tab; Refill: 0    5. Sleep apnea, unspecified type  Uses a CPAP machine    6. Ulcerative colitis without complications, unspecified location (CMS-[facility])  Followed by gastroenterology    Procedure: Right index MCP joint cortisone injection  Separate and distinct from conservative care visit as focus of visit today, patient was noticed to have pain in right MCP joints and was offered a cortisone injection in the right MCP joint and patient requested and was agreeable to procedure.    The procedure was explained to the patient in detail, all questions were answered. Risks and benefits were reviewed with patient and patient states understanding including risks of steroid injections including bleeding, infections, subcutaneous lipolysis and/or hypopigmentation at site of injection, and risk of avascular necrosis. Consent was obtained. The patient was positioned appropriately. Anatomical landmarks were identified.    Right MCP joint dorsal aspect was prepped with betadine x 3, local anesthetic with ethyl chloride and 2% Xylocaine lot #[id number],  Exp July 2019 and using sterile technique,  injected 5 mg of Depomedrol Lot #[id number], Exp February 2019    EBL 0  The patient tolerated the procedure well, was observed in the office and there were no complications     Patient was asked to rest right index finger for 3 days, patient states understanding and states will comply.        Followup: Return in about 6 months (around 10/3/2018). or sooner prn    Patient was seen 30 minutes face-to-face of which more than 50% of the time was spent counseling the patient (excluding time for procedures)  regarding  rheumatological condition and care. Therapy was discussed in detail.    Please note that this dictation was created using voice recognition software. I have made every reasonable attempt to correct obvious errors, but I expect that there are errors of grammar and possibly content that I did not discover before finalizing the note.

## 2018-04-09 ENCOUNTER — HOSPITAL ENCOUNTER (OUTPATIENT)
Dept: LAB | Facility: MEDICAL CENTER | Age: 73
End: 2018-04-09
Attending: FAMILY MEDICINE
Payer: MEDICARE

## 2018-04-09 DIAGNOSIS — R25.2 SPASM: ICD-10-CM

## 2018-04-09 DIAGNOSIS — M1A.09X0 IDIOPATHIC CHRONIC GOUT OF MULTIPLE SITES WITHOUT TOPHUS: ICD-10-CM

## 2018-04-09 DIAGNOSIS — M79.641 RIGHT HAND PAIN: ICD-10-CM

## 2018-04-09 DIAGNOSIS — M54.50 CHRONIC BILATERAL LOW BACK PAIN WITHOUT SCIATICA: ICD-10-CM

## 2018-04-09 DIAGNOSIS — G89.29 CHRONIC BILATERAL LOW BACK PAIN WITHOUT SCIATICA: ICD-10-CM

## 2018-04-09 LAB
ALBUMIN SERPL BCP-MCNC: 4.2 G/DL (ref 3.2–4.9)
ALBUMIN/GLOB SERPL: 1.5 G/DL
ALP SERPL-CCNC: 69 U/L (ref 30–99)
ALT SERPL-CCNC: 19 U/L (ref 2–50)
ANION GAP SERPL CALC-SCNC: 6 MMOL/L (ref 0–11.9)
AST SERPL-CCNC: 16 U/L (ref 12–45)
BASOPHILS # BLD AUTO: 0.7 % (ref 0–1.8)
BASOPHILS # BLD: 0.03 K/UL (ref 0–0.12)
BILIRUB SERPL-MCNC: 0.9 MG/DL (ref 0.1–1.5)
BUN SERPL-MCNC: 18 MG/DL (ref 8–22)
CALCIUM SERPL-MCNC: 9.6 MG/DL (ref 8.5–10.5)
CHLORIDE SERPL-SCNC: 106 MMOL/L (ref 96–112)
CO2 SERPL-SCNC: 27 MMOL/L (ref 20–33)
CREAT SERPL-MCNC: 1.02 MG/DL (ref 0.5–1.4)
EOSINOPHIL # BLD AUTO: 0.09 K/UL (ref 0–0.51)
EOSINOPHIL NFR BLD: 2.1 % (ref 0–6.9)
ERYTHROCYTE [DISTWIDTH] IN BLOOD BY AUTOMATED COUNT: 46.3 FL (ref 35.9–50)
GLOBULIN SER CALC-MCNC: 2.8 G/DL (ref 1.9–3.5)
GLUCOSE SERPL-MCNC: 147 MG/DL (ref 65–99)
HCT VFR BLD AUTO: 42.5 % (ref 42–52)
HGB BLD-MCNC: 15.4 G/DL (ref 14–18)
IMM GRANULOCYTES # BLD AUTO: 0.01 K/UL (ref 0–0.11)
IMM GRANULOCYTES NFR BLD AUTO: 0.2 % (ref 0–0.9)
LYMPHOCYTES # BLD AUTO: 1.21 K/UL (ref 1–4.8)
LYMPHOCYTES NFR BLD: 28.5 % (ref 22–41)
MCH RBC QN AUTO: 35.2 PG (ref 27–33)
MCHC RBC AUTO-ENTMCNC: 36.2 G/DL (ref 33.7–35.3)
MCV RBC AUTO: 97 FL (ref 81.4–97.8)
MONOCYTES # BLD AUTO: 0.32 K/UL (ref 0–0.85)
MONOCYTES NFR BLD AUTO: 7.5 % (ref 0–13.4)
NEUTROPHILS # BLD AUTO: 2.58 K/UL (ref 1.82–7.42)
NEUTROPHILS NFR BLD: 61 % (ref 44–72)
NRBC # BLD AUTO: 0 K/UL
NRBC BLD-RTO: 0 /100 WBC
PLATELET # BLD AUTO: 162 K/UL (ref 164–446)
PMV BLD AUTO: 10.7 FL (ref 9–12.9)
POTASSIUM SERPL-SCNC: 4 MMOL/L (ref 3.6–5.5)
PROT SERPL-MCNC: 7 G/DL (ref 6–8.2)
RBC # BLD AUTO: 4.38 M/UL (ref 4.7–6.1)
SODIUM SERPL-SCNC: 139 MMOL/L (ref 135–145)
URATE SERPL-MCNC: 5.2 MG/DL (ref 2.5–8.3)
WBC # BLD AUTO: 4.2 K/UL (ref 4.8–10.8)

## 2018-04-09 PROCEDURE — 84550 ASSAY OF BLOOD/URIC ACID: CPT

## 2018-04-09 PROCEDURE — 85025 COMPLETE CBC W/AUTO DIFF WBC: CPT

## 2018-04-09 PROCEDURE — 36415 COLL VENOUS BLD VENIPUNCTURE: CPT

## 2018-04-09 PROCEDURE — 80053 COMPREHEN METABOLIC PANEL: CPT

## 2018-05-07 ENCOUNTER — HOSPITAL ENCOUNTER (OUTPATIENT)
Dept: LAB | Facility: MEDICAL CENTER | Age: 73
End: 2018-05-07
Attending: UROLOGY
Payer: MEDICARE

## 2018-05-07 LAB
ANION GAP SERPL CALC-SCNC: 10 MMOL/L (ref 0–11.9)
BUN SERPL-MCNC: 16 MG/DL (ref 8–22)
CALCIUM SERPL-MCNC: 9.7 MG/DL (ref 8.5–10.5)
CHLORIDE SERPL-SCNC: 107 MMOL/L (ref 96–112)
CO2 SERPL-SCNC: 22 MMOL/L (ref 20–33)
CREAT SERPL-MCNC: 1.07 MG/DL (ref 0.5–1.4)
GLUCOSE SERPL-MCNC: 106 MG/DL (ref 65–99)
POTASSIUM SERPL-SCNC: 4.8 MMOL/L (ref 3.6–5.5)
PSA SERPL-MCNC: 2.34 NG/ML (ref 0–4)
SODIUM SERPL-SCNC: 139 MMOL/L (ref 135–145)

## 2018-05-07 PROCEDURE — 84153 ASSAY OF PSA TOTAL: CPT

## 2018-05-07 PROCEDURE — 80048 BASIC METABOLIC PNL TOTAL CA: CPT

## 2018-06-21 ENCOUNTER — HOSPITAL ENCOUNTER (OUTPATIENT)
Dept: LAB | Facility: MEDICAL CENTER | Age: 73
End: 2018-06-21
Attending: FAMILY MEDICINE
Payer: MEDICARE

## 2018-06-21 LAB
ALBUMIN SERPL BCP-MCNC: 4.3 G/DL (ref 3.2–4.9)
ALBUMIN/GLOB SERPL: 1.8 G/DL
ALP SERPL-CCNC: 51 U/L (ref 30–99)
ALT SERPL-CCNC: 17 U/L (ref 2–50)
ANION GAP SERPL CALC-SCNC: 9 MMOL/L (ref 0–11.9)
AST SERPL-CCNC: 14 U/L (ref 12–45)
BASOPHILS # BLD AUTO: 0.6 % (ref 0–1.8)
BASOPHILS # BLD: 0.03 K/UL (ref 0–0.12)
BILIRUB SERPL-MCNC: 1.2 MG/DL (ref 0.1–1.5)
BUN SERPL-MCNC: 17 MG/DL (ref 8–22)
CALCIUM SERPL-MCNC: 9 MG/DL (ref 8.5–10.5)
CHLORIDE SERPL-SCNC: 106 MMOL/L (ref 96–112)
CHOLEST SERPL-MCNC: 160 MG/DL (ref 100–199)
CO2 SERPL-SCNC: 25 MMOL/L (ref 20–33)
CREAT SERPL-MCNC: 1.11 MG/DL (ref 0.5–1.4)
CREAT UR-MCNC: 121.7 MG/DL
EOSINOPHIL # BLD AUTO: 0.08 K/UL (ref 0–0.51)
EOSINOPHIL NFR BLD: 1.7 % (ref 0–6.9)
ERYTHROCYTE [DISTWIDTH] IN BLOOD BY AUTOMATED COUNT: 48.6 FL (ref 35.9–50)
EST. AVERAGE GLUCOSE BLD GHB EST-MCNC: 140 MG/DL
GLOBULIN SER CALC-MCNC: 2.4 G/DL (ref 1.9–3.5)
GLUCOSE SERPL-MCNC: 112 MG/DL (ref 65–99)
HBA1C MFR BLD: 6.5 % (ref 0–5.6)
HCT VFR BLD AUTO: 45 % (ref 42–52)
HDLC SERPL-MCNC: 55 MG/DL
HGB BLD-MCNC: 15.2 G/DL (ref 14–18)
IMM GRANULOCYTES # BLD AUTO: 0.01 K/UL (ref 0–0.11)
IMM GRANULOCYTES NFR BLD AUTO: 0.2 % (ref 0–0.9)
LDLC SERPL CALC-MCNC: 83 MG/DL
LYMPHOCYTES # BLD AUTO: 1.63 K/UL (ref 1–4.8)
LYMPHOCYTES NFR BLD: 33.8 % (ref 22–41)
MCH RBC QN AUTO: 33 PG (ref 27–33)
MCHC RBC AUTO-ENTMCNC: 33.8 G/DL (ref 33.7–35.3)
MCV RBC AUTO: 97.6 FL (ref 81.4–97.8)
MICROALBUMIN UR-MCNC: 1.2 MG/DL
MICROALBUMIN/CREAT UR: 10 MG/G (ref 0–30)
MONOCYTES # BLD AUTO: 0.37 K/UL (ref 0–0.85)
MONOCYTES NFR BLD AUTO: 7.7 % (ref 0–13.4)
NEUTROPHILS # BLD AUTO: 2.7 K/UL (ref 1.82–7.42)
NEUTROPHILS NFR BLD: 56 % (ref 44–72)
NRBC # BLD AUTO: 0 K/UL
NRBC BLD-RTO: 0 /100 WBC
PLATELET # BLD AUTO: 172 K/UL (ref 164–446)
PMV BLD AUTO: 9.5 FL (ref 9–12.9)
POTASSIUM SERPL-SCNC: 4.3 MMOL/L (ref 3.6–5.5)
PROT SERPL-MCNC: 6.7 G/DL (ref 6–8.2)
RBC # BLD AUTO: 4.61 M/UL (ref 4.7–6.1)
SODIUM SERPL-SCNC: 140 MMOL/L (ref 135–145)
TRIGL SERPL-MCNC: 111 MG/DL (ref 0–149)
URATE SERPL-MCNC: 5.2 MG/DL (ref 2.5–8.3)
WBC # BLD AUTO: 4.8 K/UL (ref 4.8–10.8)

## 2018-06-21 PROCEDURE — 82570 ASSAY OF URINE CREATININE: CPT

## 2018-06-21 PROCEDURE — 84550 ASSAY OF BLOOD/URIC ACID: CPT

## 2018-06-21 PROCEDURE — 80061 LIPID PANEL: CPT

## 2018-06-21 PROCEDURE — 80053 COMPREHEN METABOLIC PANEL: CPT

## 2018-06-21 PROCEDURE — 85025 COMPLETE CBC W/AUTO DIFF WBC: CPT

## 2018-06-21 PROCEDURE — 36415 COLL VENOUS BLD VENIPUNCTURE: CPT

## 2018-06-21 PROCEDURE — 83036 HEMOGLOBIN GLYCOSYLATED A1C: CPT

## 2018-06-21 PROCEDURE — 82043 UR ALBUMIN QUANTITATIVE: CPT

## 2018-08-14 ENCOUNTER — OFFICE VISIT (OUTPATIENT)
Dept: URGENT CARE | Facility: CLINIC | Age: 73
End: 2018-08-14
Payer: MEDICARE

## 2018-08-14 VITALS
RESPIRATION RATE: 18 BRPM | OXYGEN SATURATION: 94 % | HEART RATE: 75 BPM | WEIGHT: 259 LBS | HEIGHT: 72 IN | DIASTOLIC BLOOD PRESSURE: 80 MMHG | TEMPERATURE: 98.1 F | SYSTOLIC BLOOD PRESSURE: 132 MMHG | BODY MASS INDEX: 35.08 KG/M2

## 2018-08-14 DIAGNOSIS — S61.412A LACERATION OF LEFT HAND WITHOUT FOREIGN BODY, INITIAL ENCOUNTER: ICD-10-CM

## 2018-08-14 PROCEDURE — 12002 RPR S/N/AX/GEN/TRNK2.6-7.5CM: CPT | Performed by: NURSE PRACTITIONER

## 2018-08-14 PROCEDURE — 99202 OFFICE O/P NEW SF 15 MIN: CPT | Mod: 25 | Performed by: NURSE PRACTITIONER

## 2018-08-14 ASSESSMENT — ENCOUNTER SYMPTOMS: ROS SKIN COMMENTS: LEFT HAND LACERATION

## 2018-08-14 NOTE — PROGRESS NOTES
Subjective:      Darold Duane Mehlhaff is a 73 y.o. male who presents with Laceration (x today pt was working on his boat he cough his Lt hand on grinor )            Laceration    Incident onset: pt reports he was buffing his boat at home with an electric buffer and sand paper. he admits the hand held buffer slipped and caught his left hand causing two seperate lacerations. The laceration is located on the left hand. The laceration is 3 cm (2 seperate 3 cm lacs) in size. The patient is experiencing no pain. He reports no foreign bodies present.       Review of Systems   Skin:        Left hand laceration   All other systems reviewed and are negative.    Past Medical History:   Diagnosis Date   • Arthritis 09/14/2017    Knees and hands.   • Diabetes (HCC)     Oral medications.   • High cholesterol    • Hypertension    • Pain    • Renal disorder     kidney stone   • Sleep apnea     Rarely uses CPAP.   • Snoring     Hx of sleep apnea.      Past Surgical History:   Procedure Laterality Date   • KNEE ARTHROPLASTY TOTAL Right 9/28/2017    Procedure: KNEE ARTHROPLASTY TOTAL;  Surgeon: Billy Arias M.D.;  Location: SURGERY [facility];  Service: Orthopedics   • EYE LACERATION REPAIR  11/30/2011    Performed by LAURA RAMOS at SURGERY SURGICAL [facility]S   • LACRIMAL DUCT PROBE  11/30/2011    Performed by LAURA RAMOS at SURGERY SURGICAL [facility] ORS   • COLONOSCOPY     • UMBILICAL HERNIA REPAIR        Social History     Social History   • Marital status:      Spouse name: N/A   • Number of children: N/A   • Years of education: N/A     Occupational History   • Not on file.     Social History Main Topics   • Smoking status: Never Smoker   • Smokeless tobacco: Never Used   • Alcohol use Yes      Comment: 5 per week   • Drug use: No   • Sexual activity: Not on file     Other Topics Concern   • Not on file     Social History Narrative   • No narrative on file          Objective:     /80   Pulse 75   Temp  36.7 °C (98.1 °F)   Resp 18   Ht 1.829 m (6')   Wt 117.5 kg (259 lb)   SpO2 94%   BMI 35.13 kg/m²      Physical Exam   Constitutional: He is oriented to person, place, and time. Vital signs are normal. He appears well-developed and well-nourished.   HENT:   Head: Normocephalic and atraumatic.   Eyes: Pupils are equal, round, and reactive to light. EOM are normal.   Neck: Normal range of motion.   Cardiovascular: Normal rate and regular rhythm.    Pulmonary/Chest: Effort normal.   Musculoskeletal: Normal range of motion.        Left hand: He exhibits laceration. Decreased strength noted. He exhibits no thumb/finger opposition and no wrist extension trouble.        Hands:  Neurological: He is alert and oriented to person, place, and time.   Skin: Skin is warm and dry. Capillary refill takes less than 2 seconds.   Psychiatric: He has a normal mood and affect. His speech is normal and behavior is normal. Thought content normal.   Vitals reviewed.         Procedure: Laceration Repair  -Risks including bleeding, nerve damage, infection, and poor cosmetic outcome discussed at length. Benefits and alternatives discussed.   -Sterile technique throughout  -Local anesthesia with 2% lidocaine  -Closed with #16  4-0 Nylon interrupted sutures with good wound approximation  -Polysporin and dressing placed  -Patient tolerated well           Assessment/Plan:     1. Laceration of left hand without foreign body, initial encounter    Avoid excessive moisture  Keep clean and dry  Apply polysporin twice daily for 2 days  Signs of developing infection discussed  RTC in 10 days for suture removal  Tylenol and ibuprofen as needed for pain  Supportive care, differential diagnoses, and indications for immediate follow-up discussed with patient.    Pathogenesis of diagnosis discussed including typical length and natural progression.      Instructed to return to  or nearest emergency department if symptoms fail to improve, for any change  in condition, further concerns, or new concerning symptoms.  Patient states understanding of the plan of care and discharge instructions.

## 2018-08-24 ENCOUNTER — OFFICE VISIT (OUTPATIENT)
Dept: URGENT CARE | Facility: CLINIC | Age: 73
End: 2018-08-24

## 2018-08-24 VITALS
RESPIRATION RATE: 16 BRPM | SYSTOLIC BLOOD PRESSURE: 122 MMHG | DIASTOLIC BLOOD PRESSURE: 76 MMHG | HEART RATE: 88 BPM | WEIGHT: 259 LBS | OXYGEN SATURATION: 98 % | TEMPERATURE: 97.5 F | BODY MASS INDEX: 35.08 KG/M2 | HEIGHT: 72 IN

## 2018-08-24 DIAGNOSIS — Z48.02 VISIT FOR SUTURE REMOVAL: ICD-10-CM

## 2018-08-24 PROCEDURE — 99024 POSTOP FOLLOW-UP VISIT: CPT | Performed by: PHYSICIAN ASSISTANT

## 2018-08-24 NOTE — PROGRESS NOTES
Exam: Suture/Staple Removal    Placed: 10 days ago    Area: left hand    Number of staples/sutures: 16    Patient is seen for suture/staple removal from a laceration. #16 sutures/staples present with several suture failures on distal part of wound/.  Sutures were removed which shows minor dehiscense distally.   Area was reinforced with steri-strips.    Follow Up: As needed.

## 2018-10-03 ENCOUNTER — OFFICE VISIT (OUTPATIENT)
Dept: RHEUMATOLOGY | Facility: MEDICAL CENTER | Age: 73
End: 2018-10-03
Payer: MEDICARE

## 2018-10-03 VITALS
WEIGHT: 256 LBS | TEMPERATURE: 97.4 F | DIASTOLIC BLOOD PRESSURE: 82 MMHG | SYSTOLIC BLOOD PRESSURE: 142 MMHG | OXYGEN SATURATION: 96 % | BODY MASS INDEX: 34.72 KG/M2 | HEART RATE: 90 BPM | RESPIRATION RATE: 12 BRPM

## 2018-10-03 DIAGNOSIS — K51.90 ULCERATIVE COLITIS WITHOUT COMPLICATIONS, UNSPECIFIED LOCATION (HCC): ICD-10-CM

## 2018-10-03 DIAGNOSIS — R25.2 SPASM: ICD-10-CM

## 2018-10-03 DIAGNOSIS — G47.30 SLEEP APNEA, UNSPECIFIED TYPE: ICD-10-CM

## 2018-10-03 DIAGNOSIS — M54.50 CHRONIC BILATERAL LOW BACK PAIN WITHOUT SCIATICA: ICD-10-CM

## 2018-10-03 DIAGNOSIS — G89.29 CHRONIC BILATERAL LOW BACK PAIN WITHOUT SCIATICA: ICD-10-CM

## 2018-10-03 DIAGNOSIS — M1A.09X0 IDIOPATHIC CHRONIC GOUT OF MULTIPLE SITES WITHOUT TOPHUS: ICD-10-CM

## 2018-10-03 PROCEDURE — 99214 OFFICE O/P EST MOD 30 MIN: CPT | Performed by: INTERNAL MEDICINE

## 2018-10-03 RX ORDER — ALLOPURINOL 300 MG/1
TABLET ORAL
Qty: 90 TAB | Refills: 1 | Status: SHIPPED | OUTPATIENT
Start: 2018-10-03

## 2018-10-03 RX ORDER — TIZANIDINE HYDROCHLORIDE 2 MG/1
CAPSULE, GELATIN COATED ORAL
Qty: 901 CAP | Refills: 1 | Status: SHIPPED | OUTPATIENT
Start: 2018-10-03 | End: 2022-07-19

## 2018-10-03 RX ORDER — ALLOPURINOL 100 MG/1
TABLET ORAL
Qty: 90 TAB | Refills: 1 | Status: SHIPPED | OUTPATIENT
Start: 2018-10-03 | End: 2022-07-19

## 2018-10-03 NOTE — LETTER
[facility]   [address] [place] [address], [address]  [place] [address]  Phone: [phone]  Fax: [phone]              Encounter Date: 10/3/2018    Dear Dr. De La Rosa ref. provider found,    It was a pleasure seeing your patient, Darold Duane Mehlhaff, on 10/3/2018. Diagnoses of Idiopathic chronic gout of multiple sites without tophus, Ulcerative colitis without complications, unspecified location (HCC), Sleep apnea, unspecified type, Spasm, and Chronic bilateral low back pain without sciatica were pertinent to this visit.     Please find attached progress note which includes the history I obtained from Mr. Ho, my physical examination findings, my impression and recommendations.      Once again, it was a pleasure participating in your patient's care.  Please feel free to contact me if you have any questions or if I can be of any further assistance to your patients.      Sincerely,    Sabrina Pino M.D.  Electronically Signed          PROGRESS NOTE:  No notes on file

## 2018-10-03 NOTE — PROGRESS NOTES
Chief Complaint- joint pain    Subjective:   Darold Duane Mehlhaff is a 73 y.o. male here today for follow up of rheumatological issues    This is a follow[facility]up visit for this [facility] who we follow for gout and DJD, patient is currently on allopurinol at 300 mg p.o. daily and states that he continues to have some flares infrequently the most recent one was about 3 weeks ago in the right midfoot area.  Patient thinks he might have had some dietary indiscretion.  Recent uric acid level 5.2 June 2018.  Patient denies any side effects from the medication, denies any unexplained weight loss, denies any fevers of unknown etiology, denies any GI upset, denies any rashes, denies any new joint swelling, denies recurrent infections.        Additional comorbidities include a right TKA within the last week.  Pt also with DM, and also has sleep apnea currently uses a CPAP machine. Pt also has  ulcerative colitis, pt takes Apriso/melasma and states that the UC is under very good control.  Patient gets colonoscopies every 2 years to follow up on the ulcers colitis.         Right TKA     Uric acid 6.0 8/2013 [facility]; Uric acid 8.4 elevated 3/2016; Uric acid 6.8 5/2017; Uric acid 5.2 6/2018  RF neg 3/2016  CCP neg 3/2016  SI joints 3/2016- normal   DEXA 4/1/2016 T scores 2.3, 0.1    FRAX 4/1/2016 major osteoporotic fracture risk 4.1%, hip fracture risk 0.3%        Current medicines (including changes today)  Current Outpatient Prescriptions   Medication Sig Dispense Refill   • tizanidine (ZANAFLEX) 2 MG capsule 1 tabs po qhs for back spasm 901 Cap 1   • allopurinol (ZYLOPRIM) 300 MG Tab 1 tab po qday, take with one 100 mg tab for a total of 400 mg/day 90 Tab 1   • allopurinol (ZYLOPRIM) 100 MG Tab 1 tab po qday, take with one 300 mg tab for a total of 400 mg/day 90 Tab 1   • amlodipine (NORVASC) 2.5 MG Tab Take 2.5 mg by mouth every day.     • tamsulosin (FLOMAX) 0.4 MG capsule Take 0.8 mg by mouth ONE-HALF HOUR AFTER BREAKFAST.      • Cyanocobalamin (VITAMIN B 12 PO) Take  by mouth every day.     • Cholecalciferol (VITAMIN D PO) Take 1 Tab by mouth every day.     • meloxicam (MOBIC) 15 MG tablet 1 tab po qday for joint pain 30 Tab 0   • atorvastatin (LIPITOR) 10 MG Tab Take 10 mg by mouth every day.     • metformin (GLUCOPHAGE) 500 MG Tab Take 500 mg by mouth every day.     • Mesalamine 0.375 GM CAPSULE SR 24 HR Take 4 Caps by mouth every day.     • aspirin EC 81 MG EC tablet Take 1 Tab by mouth 2 times a day. 30 Tab    • docusate sodium 100 MG Cap Take 100 mg by mouth 2 times a day. 60 Cap    • hydrocodone-acetaminophen (NORCO) 5-325 MG Tab per tablet Take 1-2 Tabs by mouth every four hours as needed. 75 Tab 0     No current facility-administered medications for this visit.      He  has a past medical history of Arthritis (09/14/2017); Diabetes (HCC); High cholesterol; Hypertension; Pain; Renal disorder; Sleep apnea; and Snoring.    ROS   Other than what is mentioned in HPI or physical exam, there is no history of headaches, double vision or blurred vision. No temporal tenderness or jaw claudication. No history of cataracts or glaucoma. No trouble swallowing difficulties or sore throats.  No chest complaints including chest pain, cough or sputum production. No GI complaints including nausea, vomiting, change in bowel habits, or past peptic ulcer disease. No history of blood in the stools. No urinary complaints including dysuria or frequency. No history of alopecia, photosensitivity, oral ulcerations, Raynaud's phenomena.       Objective:     Blood pressure 142/82, pulse 90, temperature 36.3 °C (97.4 °F), temperature source Temporal, resp. rate 12, weight 116.1 kg (256 lb), SpO2 96 %. Body mass index is 34.72 kg/m².   Physical Exam:    Constitutional: Alert and oriented X3, patient is talkative with good eye contact.Skin: Warm, dry, good turgor, no rashes in visible areas, no psoriatic lesions, no petechial lesions, no malar rashes  .Eye:  Equal, round and reactive, conjunctiva clear, lids normal EOM intactENMT: Lips without lesions, good dentition, no oropharyngeal ulcers, moist buccal mucosa, pinna without deformityNeck: Trachea midline, no masses, no thyromegaly.Lymph:  No cervical lymphadenopathy, no axillary lymphadenopathy, no supraclavicular lymphadenopathyRespiratory: Unlabored respiratory effort, lungs clear to auscultation, no wheezes, no ronchi.Cardiovascular: Normal S1, S2, no murmur, no edema.Abdomen: Soft, non-tender, no masses, no hepatosplenomegaly.Psych: Alert and oriented x3, normal affect and mood.Neuro: Cranial nerves 2-12 are grossly intact, no loss of sensation LEExt:no joint laxity noted in bilateral arms, no joint laxity noted in bilateral legs, there is some mild erythema on the dorsal midfoot of the right foot but no swelling and no tenderness palpation, no tophi noted on the great toes, no tophi on any extensor surfaces, hands evidence of Heberden's nodes on most PIP joints of both hands but no flexion contractures, no sausage digits, no dactylitis, shoulders with full range of motion including internal and external rotation as well as abduction, no Achilles inflammation, gait without antalgia and without foot drop    Lab Results   Component Value Date/Time    SODIUM 140 06/21/2018 09:34 AM    POTASSIUM 4.3 06/21/2018 09:34 AM    CHLORIDE 106 06/21/2018 09:34 AM    CO2 25 06/21/2018 09:34 AM    GLUCOSE 112 (H) 06/21/2018 09:34 AM    BUN 17 06/21/2018 09:34 AM    CREATININE 1.11 06/21/2018 09:34 AM      Lab Results   Component Value Date/Time    WBC 4.8 06/21/2018 09:34 AM    RBC 4.61 (L) 06/21/2018 09:34 AM    HEMOGLOBIN 15.2 06/21/2018 09:34 AM    HEMATOCRIT 45.0 06/21/2018 09:34 AM    MCV 97.6 06/21/2018 09:34 AM    MCH 33.0 06/21/2018 09:34 AM    MCHC 33.8 06/21/2018 09:34 AM    MPV 9.5 06/21/2018 09:34 AM    NEUTSPOLYS 56.00 06/21/2018 09:34 AM    LYMPHOCYTES 33.80 06/21/2018 09:34 AM    MONOCYTES 7.70 06/21/2018 09:34  AM    EOSINOPHILS 1.70 06/21/2018 09:34 AM    BASOPHILS 0.60 06/21/2018 09:34 AM      Lab Results   Component Value Date/Time    CALCIUM 9.0 06/21/2018 09:34 AM    ASTSGOT 14 06/21/2018 09:34 AM    ALTSGPT 17 06/21/2018 09:34 AM    ALKPHOSPHAT 51 06/21/2018 09:34 AM    TBILIRUBIN 1.2 06/21/2018 09:34 AM    ALBUMIN 4.3 06/21/2018 09:34 AM    TOTPROTEIN 6.7 06/21/2018 09:34 AM     Lab Results   Component Value Date/Time    URICACID 5.2 06/21/2018 09:34 AM    RHEUMFACTN <10 03/16/2016 08:34 AM    CCPANTIBODY 3 03/16/2016 08:34 AM     Lab Results   Component Value Date/Time    SEDRATEWES 2 11/21/2016 03:21 PM     Lab Results   Component Value Date/Time    HBA1C 6.5 (H) 06/21/2018 09:34 AM     Results for orders placed during the hospital encounter of 04/01/16   DS-BONE DENSITY STUDY (DEXA)    Impression According to the World Health Organization classification, bone mineral density of this patient is normal.        10-year Probability of Fracture:  Major Osteoporotic     4.1%  Hip     0.3%  Population      [place] ()    Based on left femur neck BMD          INTERPRETING LOCATION:  [address] [place] [address], [id number]     Results for orders placed during the hospital encounter of 06/14/17   DX-KNEES-AP BILATERAL STANDING    Impression Osteoarthropathy primarily involving the medial compartments, right greater than left.     Results for orders placed during the hospital encounter of 06/14/17   DX-KNEES-AP BILATERAL STANDING    Impression Osteoarthropathy primarily involving the medial compartments, right greater than left.     Results for orders placed during the hospital encounter of 03/16/16   DX-SACROILIAC JOINTS 3+    Impression Negative exam of the sacroiliac joints.     Results for orders placed during the hospital encounter of 06/14/17   MR-KNEE-W/O RIGHT    Impression 1.  Severe osteoarthritis with extensive full-thickness articular cartilage loss in the medial compartment.    2.  Complex degenerative  tear in the posterior horn and body of the medial meniscus.    3.  Complex tear in the posterior horn of the lateral meniscus near the meniscal root.    4.  Mucoid degeneration of the anterior cruciate ligament.    5.  Tendinopathy of the popliteus tendon.    6.  Lobulated low T1, high T2 signal lesion in the medial femoral condyle is consistent with a benign cartilaginous lesion.    7.  Small knee effusion with synovitis.    8.  Moderate size Baker cyst.     Assessment and Plan:     1. Idiopathic chronic gout of multiple sites without tophus  Continues to have mild flares in spite of allopurinol at 300 mg p.o. daily, creatinine level is stable and within normal range we will increase allopurinol to 400 mg p.o. daily recheck uric acid levels in about 2-3 months  - tizanidine (ZANAFLEX) 2 MG capsule; 1 tabs po qhs for back spasm  Dispense: 901 Cap; Refill: 1  - allopurinol (ZYLOPRIM) 300 MG Tab; 1 tab po qday, take with one 100 mg tab for a total of 400 mg/day  Dispense: 90 Tab; Refill: 1  - allopurinol (ZYLOPRIM) 100 MG Tab; 1 tab po qday, take with one 300 mg tab for a total of 400 mg/day  Dispense: 90 Tab; Refill: 1  - URIC ACID, SERUM    2. Ulcerative colitis without complications, unspecified location (HCC)  Followed by GI currently on mesalamine with good control, not exacerbated by allopurinol  - tizanidine (ZANAFLEX) 2 MG capsule; 1 tabs po qhs for back spasm  Dispense: 901 Cap; Refill: 1  - allopurinol (ZYLOPRIM) 300 MG Tab; 1 tab po qday, take with one 100 mg tab for a total of 400 mg/day  Dispense: 90 Tab; Refill: 1  - allopurinol (ZYLOPRIM) 100 MG Tab; 1 tab po qday, take with one 300 mg tab for a total of 400 mg/day  Dispense: 90 Tab; Refill: 1  - URIC ACID, SERUM    3. Sleep apnea, unspecified type  Uses a CPAP machine nightly  - tizanidine (ZANAFLEX) 2 MG capsule; 1 tabs po qhs for back spasm  Dispense: 901 Cap; Refill: 1  - allopurinol (ZYLOPRIM) 300 MG Tab; 1 tab po qday, take with one 100 mg tab for  a total of 400 mg/day  Dispense: 90 Tab; Refill: 1  - allopurinol (ZYLOPRIM) 100 MG Tab; 1 tab po qday, take with one 300 mg tab for a total of 400 mg/day  Dispense: 90 Tab; Refill: 1    4. Spasm  Secondary to chronic low back pain refill tizanidine 2 mg p.o. nightly  - tizanidine (ZANAFLEX) 2 MG capsule; 1 tabs po qhs for back spasm  Dispense: 901 Cap; Refill: 1  - allopurinol (ZYLOPRIM) 300 MG Tab; 1 tab po qday, take with one 100 mg tab for a total of 400 mg/day  Dispense: 90 Tab; Refill: 1    5. Chronic bilateral low back pain without sciatica  Secondary to DJD, patient uses Naprosyn as needed, refill tizanidine nightly to help with spasms  - tizanidine (ZANAFLEX) 2 MG capsule; 1 tabs po qhs for back spasm  Dispense: 901 Cap; Refill: 1  - allopurinol (ZYLOPRIM) 300 MG Tab; 1 tab po qday, take with one 100 mg tab for a total of 400 mg/day  Dispense: 90 Tab; Refill: 1    Followup: Return in about 6 months (around 4/3/2019). or sooner prn Darold Duane Mehlhaff was seen 30 minutes face-to-face of which more than 50% of the time was spent counseling the patient (excluding time for procedures)  regarding  rheumatological condition and care. Therapy was discussed in detail.    Please note that this dictation was created using voice recognition software. I have made every reasonable attempt to correct obvious errors, but I expect that there are errors of grammar and possibly content that I did not discover before finalizing the note.

## 2019-03-04 ENCOUNTER — HOSPITAL ENCOUNTER (OUTPATIENT)
Dept: LAB | Facility: MEDICAL CENTER | Age: 74
End: 2019-03-04
Attending: FAMILY MEDICINE
Payer: MEDICARE

## 2019-03-04 LAB
ALBUMIN SERPL BCP-MCNC: 4.5 G/DL (ref 3.2–4.9)
ALBUMIN/GLOB SERPL: 1.6 G/DL
ALP SERPL-CCNC: 64 U/L (ref 30–99)
ALT SERPL-CCNC: 21 U/L (ref 2–50)
ANION GAP SERPL CALC-SCNC: 6 MMOL/L (ref 0–11.9)
AST SERPL-CCNC: 16 U/L (ref 12–45)
BILIRUB SERPL-MCNC: 0.9 MG/DL (ref 0.1–1.5)
BUN SERPL-MCNC: 17 MG/DL (ref 8–22)
CALCIUM SERPL-MCNC: 10.3 MG/DL (ref 8.5–10.5)
CHLORIDE SERPL-SCNC: 107 MMOL/L (ref 96–112)
CHOLEST SERPL-MCNC: 154 MG/DL (ref 100–199)
CO2 SERPL-SCNC: 27 MMOL/L (ref 20–33)
CREAT SERPL-MCNC: 1.06 MG/DL (ref 0.5–1.4)
FASTING STATUS PATIENT QL REPORTED: NORMAL
GLOBULIN SER CALC-MCNC: 2.9 G/DL (ref 1.9–3.5)
GLUCOSE SERPL-MCNC: 143 MG/DL (ref 65–99)
HDLC SERPL-MCNC: 48 MG/DL
LDLC SERPL CALC-MCNC: 72 MG/DL
POTASSIUM SERPL-SCNC: 4.7 MMOL/L (ref 3.6–5.5)
PROT SERPL-MCNC: 7.4 G/DL (ref 6–8.2)
SODIUM SERPL-SCNC: 140 MMOL/L (ref 135–145)
TRIGL SERPL-MCNC: 169 MG/DL (ref 0–149)

## 2019-03-04 PROCEDURE — 80061 LIPID PANEL: CPT

## 2019-03-04 PROCEDURE — 80053 COMPREHEN METABOLIC PANEL: CPT

## 2019-03-04 PROCEDURE — 36415 COLL VENOUS BLD VENIPUNCTURE: CPT

## 2019-03-25 ENCOUNTER — TELEPHONE (OUTPATIENT)
Dept: RHEUMATOLOGY | Facility: MEDICAL CENTER | Age: 74
End: 2019-03-25

## 2019-03-25 ENCOUNTER — OFFICE VISIT (OUTPATIENT)
Dept: RHEUMATOLOGY | Facility: MEDICAL CENTER | Age: 74
End: 2019-03-25
Payer: MEDICARE

## 2019-03-25 VITALS
OXYGEN SATURATION: 95 % | BODY MASS INDEX: 35.64 KG/M2 | DIASTOLIC BLOOD PRESSURE: 80 MMHG | TEMPERATURE: 97.2 F | WEIGHT: 262.79 LBS | SYSTOLIC BLOOD PRESSURE: 140 MMHG | RESPIRATION RATE: 14 BRPM | HEART RATE: 76 BPM

## 2019-03-25 DIAGNOSIS — G89.29 CHRONIC BILATERAL LOW BACK PAIN, WITH SCIATICA PRESENCE UNSPECIFIED: ICD-10-CM

## 2019-03-25 DIAGNOSIS — K51.90 ULCERATIVE COLITIS WITHOUT COMPLICATIONS, UNSPECIFIED LOCATION (HCC): ICD-10-CM

## 2019-03-25 DIAGNOSIS — M1A.09X0 IDIOPATHIC CHRONIC GOUT OF MULTIPLE SITES WITHOUT TOPHUS: ICD-10-CM

## 2019-03-25 DIAGNOSIS — E11.65 TYPE 2 DIABETES MELLITUS WITH HYPERGLYCEMIA, WITHOUT LONG-TERM CURRENT USE OF INSULIN (HCC): ICD-10-CM

## 2019-03-25 DIAGNOSIS — Z79.899 ENCOUNTER FOR MONITORING ALLOPURINOL THERAPY: ICD-10-CM

## 2019-03-25 DIAGNOSIS — Z51.81 ENCOUNTER FOR MONITORING ALLOPURINOL THERAPY: ICD-10-CM

## 2019-03-25 DIAGNOSIS — G56.03 CARPAL TUNNEL SYNDROME ON BOTH SIDES: ICD-10-CM

## 2019-03-25 DIAGNOSIS — M54.5 CHRONIC BILATERAL LOW BACK PAIN, WITH SCIATICA PRESENCE UNSPECIFIED: ICD-10-CM

## 2019-03-25 DIAGNOSIS — G47.30 SLEEP APNEA, UNSPECIFIED TYPE: ICD-10-CM

## 2019-03-25 PROCEDURE — 99214 OFFICE O/P EST MOD 30 MIN: CPT | Performed by: INTERNAL MEDICINE

## 2019-03-25 NOTE — LETTER
[facility]   [address] [place] [address], [address]  [place] [address]  Phone: [phone]  Fax: [phone]              Encounter Date: 3/25/2019    Dear Dr. De La Rosa ref. provider found,    It was a pleasure seeing your patient, Darold Duane Mehlhaff, on 3/25/2019. Diagnoses of Idiopathic chronic gout of multiple sites without tophus, Encounter for monitoring allopurinol therapy, Carpal tunnel syndrome on both sides, Chronic bilateral low back pain, with sciatica presence unspecified, Ulcerative colitis without complications, unspecified location (HCC), Sleep apnea, unspecified type, and Type 2 diabetes mellitus with hyperglycemia, without long-term current use of insulin (HCC) were pertinent to this visit.     Please find attached progress note which includes the history I obtained from Mr. Ho, my physical examination findings, my impression and recommendations.      Once again, it was a pleasure participating in your patient's care.  Please feel free to contact me if you have any questions or if I can be of any further assistance to your patients.      Sincerely,    Sabrina Pino M.D.  Electronically Signed          PROGRESS NOTE:  No notes on file

## 2019-03-25 NOTE — LETTER
[facility]   [address] [place] [address], [address]  [place] [address]  Phone: [phone]  Fax: [phone]              Encounter Date: 3/25/2019    Dear Dr. De La Rosa ref. provider found,    It was a pleasure seeing your patient, Darold Duane Mehlhaff, on 3/25/2019. Diagnoses of Idiopathic chronic gout of multiple sites without tophus, Encounter for monitoring allopurinol therapy, Carpal tunnel syndrome on both sides, Chronic bilateral low back pain, with sciatica presence unspecified, Ulcerative colitis without complications, unspecified location (HCC), Sleep apnea, unspecified type, and Type 2 diabetes mellitus with hyperglycemia, without long-term current use of insulin (HCC) were pertinent to this visit.     Please find attached progress note which includes the history I obtained from Mr. Ho, my physical examination findings, my impression and recommendations.      Once again, it was a pleasure participating in your patient's care.  Please feel free to contact me if you have any questions or if I can be of any further assistance to your patients.      Sincerely,    Sabrina Pino M.D.  Electronically Signed          PROGRESS NOTE:  Chief Complaint- joint pain    Subjective:   Darold Duane Mehlhaff is a 73 y.o. male here today for follow up of rheumatological issues    This is a follow[facility]up visit for [facility] who we follow in this clinic for gout and DJD.  Patient is currently on allopurinol at 400 mg p.o. daily with excellent control of his gout.  Patient denies any side effects from the medication, denies any unexplained weight loss, denies any fevers of unknown etiology, denies any GI upset, denies any rashes, denies any new joint swelling, denies recurrent infections.     Patient is complaining of other issues today is complaining of numbness in the thumb and index finger bilaterally, has never had issues with carpal tunnel in the past,     Patient also complaining of chronic  low back pain exacerbation last x-rays done March 2016 indicate mild compression and DJD.  Patient's not done any kind of physical therapy, does have some mild central obesity does take meloxicam as needed.         Additional comorbidities include a right TKA within the last week.  Pt also with DM, and also has sleep apnea currently uses a CPAP machine. Pt also has  ulcerative colitis, pt takes Apriso/melasma and states that the UC is under very good control.  Patient gets colonoscopies every 2 years to follow up on the ulcers colitis.         Right TKA     Uric acid 6.0 8/2013 [facility]; Uric acid 8.4 elevated 3/2016; Uric acid 6.8 5/2017; Uric acid 5.2 6/2018  RF neg 3/2016  CCP neg 3/2016  SI joints 3/2016- normal   DEXA 4/1/2016 T scores 2.3, 0.1    FRAX 4/1/2016 major osteoporotic fracture risk 4.1%, hip fracture risk 0.3%     Current medicines (including changes today)  Current Outpatient Prescriptions   Medication Sig Dispense Refill   • tizanidine (ZANAFLEX) 2 MG capsule 1 tabs po qhs for back spasm 901 Cap 1   • allopurinol (ZYLOPRIM) 300 MG Tab 1 tab po qday, take with one 100 mg tab for a total of 400 mg/day 90 Tab 1   • allopurinol (ZYLOPRIM) 100 MG Tab 1 tab po qday, take with one 300 mg tab for a total of 400 mg/day 90 Tab 1   • amlodipine (NORVASC) 2.5 MG Tab Take 2.5 mg by mouth every day.     • tamsulosin (FLOMAX) 0.4 MG capsule Take 0.8 mg by mouth ONE-HALF HOUR AFTER BREAKFAST.     • atorvastatin (LIPITOR) 10 MG Tab Take 10 mg by mouth every day.     • metformin (GLUCOPHAGE) 500 MG Tab Take 500 mg by mouth every day.     • Mesalamine 0.375 GM CAPSULE SR 24 HR Take 4 Caps by mouth every day.     • aspirin EC 81 MG EC tablet Take 1 Tab by mouth 2 times a day. 30 Tab    • docusate sodium 100 MG Cap Take 100 mg by mouth 2 times a day. 60 Cap    • hydrocodone-acetaminophen (NORCO) 5-325 MG Tab per tablet Take 1-2 Tabs by mouth every four hours as needed. (Patient not taking: Reported on 3/25/2019) 75 Tab 0    • Cyanocobalamin (VITAMIN B 12 PO) Take  by mouth every day.     • Cholecalciferol (VITAMIN D PO) Take 1 Tab by mouth every day.     • meloxicam (MOBIC) 15 MG tablet 1 tab po qday for joint pain (Patient not taking: Reported on 3/25/2019) 30 Tab 0     No current facility-administered medications for this visit.      He  has a past medical history of Arthritis (09/14/2017); Diabetes (HCC); High cholesterol; Hypertension; Pain; Renal disorder; Sleep apnea; and Snoring.    ROS   Other than what is mentioned in HPI or physical exam, there is no history of headaches, double vision or blurred vision. No temporal tenderness or jaw claudication. No history of cataracts or glaucoma. No trouble swallowing difficulties or sore throats.  No chest complaints including chest pain, cough or sputum production. No GI complaints including nausea, vomiting, change in bowel habits, or past peptic ulcer disease. No history of blood in the stools. No urinary complaints including dysuria or frequency. No history of alopecia, photosensitivity, oral ulcerations, Raynaud's phenomena.       Objective:     Blood pressure 140/80, pulse 76, temperature 36.2 °C (97.2 °F), temperature source Temporal, resp. rate 14, weight 119.2 kg (262 lb 12.6 oz), SpO2 95 %. Body mass index is 35.64 kg/m².   Physical Exam:    Constitutional: Alert and oriented X3, patient is talkative with good eye contact.Skin: Warm, dry, good turgor, no rashes in visible areas, no psoriatic lesions noted.Eye: Equal, round and reactive, conjunctiva clear, lids normal EOM intactENMT: Lips without lesions, good dentition, no oropharyngeal ulcers, moist buccal mucosa, pinna without deformityNeck: Trachea midline, no masses, no thyromegaly.Lymph:  No cervical lymphadenopathy, no axillary lymphadenopathy, no supraclavicular lymphadenopathyRespiratory: Unlabored respiratory effort, lungs clear to auscultation, no wheezes, no ronchi.Cardiovascular: Normal S1, S2, no murmur, no  edema.Abdomen: Soft, non-tender, no masses, no hepatosplenomegaly.Psych: Alert and oriented x3, normal affect and mood.Neuro: Cranial nerves 2-12 are grossly intact, no loss of sensation LEExt:no joint laxity noted in bilateral arms, no joint laxity noted in bilateral legs, no tophi no nodules no dactylitis no crossover toes no splay toes, patient does have some mild josselyn-lumbar spine tenderness but no step-offs noted, did not see any thenar or hyperthenar eminence atrophy    Lab Results   Component Value Date/Time    SODIUM 140 03/04/2019 08:13 AM    POTASSIUM 4.7 03/04/2019 08:13 AM    CHLORIDE 107 03/04/2019 08:13 AM    CO2 27 03/04/2019 08:13 AM    GLUCOSE 143 (H) 03/04/2019 08:13 AM    BUN 17 03/04/2019 08:13 AM    CREATININE 1.06 03/04/2019 08:13 AM      Lab Results   Component Value Date/Time    WBC 4.8 06/21/2018 09:34 AM    RBC 4.61 (L) 06/21/2018 09:34 AM    HEMOGLOBIN 15.2 06/21/2018 09:34 AM    HEMATOCRIT 45.0 06/21/2018 09:34 AM    MCV 97.6 06/21/2018 09:34 AM    MCH 33.0 06/21/2018 09:34 AM    MCHC 33.8 06/21/2018 09:34 AM    MPV 9.5 06/21/2018 09:34 AM    NEUTSPOLYS 56.00 06/21/2018 09:34 AM    LYMPHOCYTES 33.80 06/21/2018 09:34 AM    MONOCYTES 7.70 06/21/2018 09:34 AM    EOSINOPHILS 1.70 06/21/2018 09:34 AM    BASOPHILS 0.60 06/21/2018 09:34 AM      Lab Results   Component Value Date/Time    CALCIUM 10.3 03/04/2019 08:13 AM    ASTSGOT 16 03/04/2019 08:13 AM    ALTSGPT 21 03/04/2019 08:13 AM    ALKPHOSPHAT 64 03/04/2019 08:13 AM    TBILIRUBIN 0.9 03/04/2019 08:13 AM    ALBUMIN 4.5 03/04/2019 08:13 AM    TOTPROTEIN 7.4 03/04/2019 08:13 AM     Lab Results   Component Value Date/Time    URICACID 5.2 06/21/2018 09:34 AM    RHEUMFACTN <10 03/16/2016 08:34 AM    CCPANTIBODY 3 03/16/2016 08:34 AM     Lab Results   Component Value Date/Time    SEDRATEWES 2 11/21/2016 03:21 PM     Lab Results   Component Value Date/Time    HBA1C 6.5 (H) 06/21/2018 09:34 AM     Results for orders placed during the hospital  encounter of 04/01/16   DS-BONE DENSITY STUDY (DEXA)    Impression According to the World Health Organization classification, bone mineral density of this patient is normal.        10-year Probability of Fracture:  Major Osteoporotic     4.1%  Hip     0.3%  Population      [place] ()    Based on left femur neck BMD          INTERPRETING LOCATION:  [address] [place] [address], [place], [address]     Results for orders placed during the hospital encounter of 06/14/17   DX-KNEES-AP BILATERAL STANDING    Impression Osteoarthropathy primarily involving the medial compartments, right greater than left.     Results for orders placed during the hospital encounter of 06/14/17   DX-KNEES-AP BILATERAL STANDING    Impression Osteoarthropathy primarily involving the medial compartments, right greater than left.     Results for orders placed during the hospital encounter of 03/16/16   DX-SACROILIAC JOINTS 3+    Impression Negative exam of the sacroiliac joints.     Results for orders placed during the hospital encounter of 06/14/17   MR-KNEE-W/O RIGHT    Impression 1.  Severe osteoarthritis with extensive full-thickness articular cartilage loss in the medial compartment.    2.  Complex degenerative tear in the posterior horn and body of the medial meniscus.    3.  Complex tear in the posterior horn of the lateral meniscus near the meniscal root.    4.  Mucoid degeneration of the anterior cruciate ligament.    5.  Tendinopathy of the popliteus tendon.    6.  Lobulated low T1, high T2 signal lesion in the medial femoral condyle is consistent with a benign cartilaginous lesion.    7.  Small knee effusion with synovitis.    8.  Moderate size Baker cyst.     Assessment and Plan:     1. Idiopathic chronic gout of multiple sites without tophus  Last uric acid level is little bit high but we had increased allopurinol from 300 mg p.o. daily to 400 mg p.o. daily, we do need to recheck uric acid level to assure control and also to monitor  renal function, labs ordered for patient today continue allopurinol at 400 mg p.o. daily  - CBC WITH DIFFERENTIAL; Future  - Comp Metabolic Panel; Future  - URIC ACID, SERUM  - REFERRAL TO NEURODIAGNOSTICS (EEG,EP,EMG/NCS/DBS) Modality Requested: EMG/NCS-Comment Extremities  - REFERRAL TO PHYSICAL THERAPY Reason for Therapy: Eval/Treat/Report    2. Encounter for monitoring allopurinol therapy  On allopurinol 400 mg p.o. daily, patient needs uric acid level and CMP checked about every 6 months, patient due we will schedule that for patient  - CBC WITH DIFFERENTIAL; Future  - Comp Metabolic Panel; Future  - URIC ACID, SERUM  - REFERRAL TO NEURODIAGNOSTICS (EEG,EP,EMG/NCS/DBS) Modality Requested: EMG/NCS-Comment Extremities    3. Carpal tunnel syndrome on both sides  Clinical symptoms of numbness in thumb and index finger bilaterally, will do EMG/NCV bilateral upper extremities for evaluation of carpal tunnel, patient may be candidate for corticosteroid injections or possible surgical decompression  - CBC WITH DIFFERENTIAL; Future  - Comp Metabolic Panel; Future  - URIC ACID, SERUM  - REFERRAL TO NEURODIAGNOSTICS (EEG,EP,EMG/NCS/DBS) Modality Requested: EMG/NCS-Comment Extremities    4. Chronic bilateral low back pain, with sciatica presence unspecified  Past x-rays indicate mild compression we will redo back x-rays to assure no further compression or other pathology will refer to physical therapy for weight loss and core muscle strengthening  - REFERRAL TO PHYSICAL THERAPY Reason for Therapy: Eval/Treat/Report  -LS spine x-rays    5. Ulcerative colitis without complications, unspecified location (HCC)  Followed by GI and under control  - REFERRAL TO PHYSICAL THERAPY Reason for Therapy: Eval/Treat/Report    6. Sleep apnea, unspecified type  Patient states he is not really consistent with using his CPAP machine  - REFERRAL TO PHYSICAL THERAPY Reason for Therapy: Eval/Treat/Report    7. Type 2 diabetes mellitus with  hyperglycemia, without long-term current use of insulin (HCC)  High blood sugar can exacerbate inflammatory state of patient's arthritis, discussed with patient the need to monitor and control blood sugars, patient states understanding  - REFERRAL TO NEURODIAGNOSTICS (EEG,EP,EMG/NCS/DBS) Modality Requested: EMG/NCS-Comment Extremities  - REFERRAL TO PHYSICAL THERAPY Reason for Therapy: Eval/Treat/Report    Followup: Return in about 6 months (around 9/25/2019). or sooner prn Darold Duane Mehlhaff  was seen *** minutes face-to-face of which more than 50% of the time was spent counseling the patient (excluding time for procedures)  regarding  rheumatological condition and care. Therapy was discussed in detail.      Please note that this dictation was created using voice recognition software. I have made every reasonable attempt to correct obvious errors, but I expect that there are errors of grammar and possibly content that I did not discover before finalizing the note.

## 2019-03-25 NOTE — PROGRESS NOTES
Chief Complaint- joint pain    Subjective:   Darold Duane Mehlhaff is a 73 y.o. male here today for follow up of rheumatological issues    This is a follow[facility]up visit [facility] who we follow in this clinic for gout and DJD.  Patient is currently on allopurinol at 400 mg p.o. daily with excellent control of his gout.  Patient denies any side effects from the medication, denies any unexplained weight loss, denies any fevers of unknown etiology, denies any GI upset, denies any rashes, denies any new joint swelling, denies recurrent infections.     Patient is complaining of other issues today is complaining of numbness in the thumb and index finger bilaterally, has never had issues with carpal tunnel in the past,     Patient also complaining of chronic low back pain exacerbation last x-rays done March 2016 indicate mild compression and DJD.  Patient's not done any kind of physical therapy, does have some mild central obesity does take meloxicam as needed.         Additional comorbidities include a right TKA within the last week.  Pt also with DM, and also has sleep apnea currently uses a CPAP machine. Pt also has  ulcerative colitis, pt takes Apriso/melasma and states that the UC is under very good control.  Patient gets colonoscopies every 2 years to follow up on the ulcers colitis.         Right TKA     Uric acid 6.0 8/2013 [facility]; Uric acid 8.4 elevated 3/2016; Uric acid 6.8 5/2017; Uric acid 5.2 6/2018  RF neg 3/2016  CCP neg 3/2016  SI joints 3/2016- normal   DEXA 4/1/2016 T scores 2.3, 0.1    FRAX 4/1/2016 major osteoporotic fracture risk 4.1%, hip fracture risk 0.3%     Current medicines (including changes today)  Current Outpatient Prescriptions   Medication Sig Dispense Refill   • tizanidine (ZANAFLEX) 2 MG capsule 1 tabs po qhs for back spasm 901 Cap 1   • allopurinol (ZYLOPRIM) 300 MG Tab 1 tab po qday, take with one 100 mg tab for a total of 400 mg/day 90 Tab 1   • allopurinol (ZYLOPRIM) 100 MG Tab 1  tab po qday, take with one 300 mg tab for a total of 400 mg/day 90 Tab 1   • amlodipine (NORVASC) 2.5 MG Tab Take 2.5 mg by mouth every day.     • tamsulosin (FLOMAX) 0.4 MG capsule Take 0.8 mg by mouth ONE-HALF HOUR AFTER BREAKFAST.     • atorvastatin (LIPITOR) 10 MG Tab Take 10 mg by mouth every day.     • metformin (GLUCOPHAGE) 500 MG Tab Take 500 mg by mouth every day.     • Mesalamine 0.375 GM CAPSULE SR 24 HR Take 4 Caps by mouth every day.     • aspirin EC 81 MG EC tablet Take 1 Tab by mouth 2 times a day. 30 Tab    • docusate sodium 100 MG Cap Take 100 mg by mouth 2 times a day. 60 Cap    • hydrocodone-acetaminophen (NORCO) 5-325 MG Tab per tablet Take 1-2 Tabs by mouth every four hours as needed. (Patient not taking: Reported on 3/25/2019) 75 Tab 0   • Cyanocobalamin (VITAMIN B 12 PO) Take  by mouth every day.     • Cholecalciferol (VITAMIN D PO) Take 1 Tab by mouth every day.     • meloxicam (MOBIC) 15 MG tablet 1 tab po qday for joint pain (Patient not taking: Reported on 3/25/2019) 30 Tab 0     No current facility-administered medications for this visit.      He  has a past medical history of Arthritis (09/14/2017); Diabetes (HCC); High cholesterol; Hypertension; Pain; Renal disorder; Sleep apnea; and Snoring.    ROS   Other than what is mentioned in HPI or physical exam, there is no history of headaches, double vision or blurred vision. No temporal tenderness or jaw claudication. No history of cataracts or glaucoma. No trouble swallowing difficulties or sore throats.  No chest complaints including chest pain, cough or sputum production. No GI complaints including nausea, vomiting, change in bowel habits, or past peptic ulcer disease. No history of blood in the stools. No urinary complaints including dysuria or frequency. No history of alopecia, photosensitivity, oral ulcerations, Raynaud's phenomena.       Objective:     Blood pressure 140/80, pulse 76, temperature 36.2 °C (97.2 °F), temperature source  Temporal, resp. rate 14, weight 119.2 kg (262 lb 12.6 oz), SpO2 95 %. Body mass index is 35.64 kg/m².   Physical Exam:    Constitutional: Alert and oriented X3, patient is talkative with good eye contact.Skin: Warm, dry, good turgor, no rashes in visible areas, no psoriatic lesions noted.Eye: Equal, round and reactive, conjunctiva clear, lids normal EOM intactENMT: Lips without lesions, good dentition, no oropharyngeal ulcers, moist buccal mucosa, pinna without deformityNeck: Trachea midline, no masses, no thyromegaly.Lymph:  No cervical lymphadenopathy, no axillary lymphadenopathy, no supraclavicular lymphadenopathyRespiratory: Unlabored respiratory effort, lungs clear to auscultation, no wheezes, no ronchi.Cardiovascular: Normal S1, S2, no murmur, no edema.Abdomen: Soft, non-tender, no masses, no hepatosplenomegaly.Psych: Alert and oriented x3, normal affect and mood.Neuro: Cranial nerves 2-12 are grossly intact, no loss of sensation LEExt:no joint laxity noted in bilateral arms, no joint laxity noted in bilateral legs, no tophi no nodules no dactylitis no crossover toes no splay toes, patient does have some mild josselyn-lumbar spine tenderness but no step-offs noted, did not see any thenar or hyperthenar eminence atrophy    Lab Results   Component Value Date/Time    SODIUM 140 03/04/2019 08:13 AM    POTASSIUM 4.7 03/04/2019 08:13 AM    CHLORIDE 107 03/04/2019 08:13 AM    CO2 27 03/04/2019 08:13 AM    GLUCOSE 143 (H) 03/04/2019 08:13 AM    BUN 17 03/04/2019 08:13 AM    CREATININE 1.06 03/04/2019 08:13 AM      Lab Results   Component Value Date/Time    WBC 4.8 06/21/2018 09:34 AM    RBC 4.61 (L) 06/21/2018 09:34 AM    HEMOGLOBIN 15.2 06/21/2018 09:34 AM    HEMATOCRIT 45.0 06/21/2018 09:34 AM    MCV 97.6 06/21/2018 09:34 AM    MCH 33.0 06/21/2018 09:34 AM    MCHC 33.8 06/21/2018 09:34 AM    MPV 9.5 06/21/2018 09:34 AM    NEUTSPOLYS 56.00 06/21/2018 09:34 AM    LYMPHOCYTES 33.80 06/21/2018 09:34 AM    MONOCYTES 7.70  06/21/2018 09:34 AM    EOSINOPHILS 1.70 06/21/2018 09:34 AM    BASOPHILS 0.60 06/21/2018 09:34 AM      Lab Results   Component Value Date/Time    CALCIUM 10.3 03/04/2019 08:13 AM    ASTSGOT 16 03/04/2019 08:13 AM    ALTSGPT 21 03/04/2019 08:13 AM    ALKPHOSPHAT 64 03/04/2019 08:13 AM    TBILIRUBIN 0.9 03/04/2019 08:13 AM    ALBUMIN 4.5 03/04/2019 08:13 AM    TOTPROTEIN 7.4 03/04/2019 08:13 AM     Lab Results   Component Value Date/Time    URICACID 5.2 06/21/2018 09:34 AM    RHEUMFACTN <10 03/16/2016 08:34 AM    CCPANTIBODY 3 03/16/2016 08:34 AM     Lab Results   Component Value Date/Time    SEDRATEWES 2 11/21/2016 03:21 PM     Lab Results   Component Value Date/Time    HBA1C 6.5 (H) 06/21/2018 09:34 AM     Results for orders placed during the hospital encounter of 04/01/16   DS-BONE DENSITY STUDY (DEXA)    Impression According to the World Health Organization classification, bone mineral density of this patient is normal.        10-year Probability of Fracture:  Major Osteoporotic     4.1%  Hip     0.3%  Population      [place] ()    Based on left femur neck BMD          INTERPRETING LOCATION:  [address] [place] [address], [id number]     Results for orders placed during the hospital encounter of 06/14/17   DX-KNEES-AP BILATERAL STANDING    Impression Osteoarthropathy primarily involving the medial compartments, right greater than left.     Results for orders placed during the hospital encounter of 06/14/17   DX-KNEES-AP BILATERAL STANDING    Impression Osteoarthropathy primarily involving the medial compartments, right greater than left.     Results for orders placed during the hospital encounter of 03/16/16   DX-SACROILIAC JOINTS 3+    Impression Negative exam of the sacroiliac joints.     Results for orders placed during the hospital encounter of 06/14/17   MR-KNEE-W/O RIGHT    Impression 1.  Severe osteoarthritis with extensive full-thickness articular cartilage loss in the medial compartment.    2.   Complex degenerative tear in the posterior horn and body of the medial meniscus.    3.  Complex tear in the posterior horn of the lateral meniscus near the meniscal root.    4.  Mucoid degeneration of the anterior cruciate ligament.    5.  Tendinopathy of the popliteus tendon.    6.  Lobulated low T1, high T2 signal lesion in the medial femoral condyle is consistent with a benign cartilaginous lesion.    7.  Small knee effusion with synovitis.    8.  Moderate size Baker cyst.     Assessment and Plan:     1. Idiopathic chronic gout of multiple sites without tophus  Last uric acid level is little bit high but we had increased allopurinol from 300 mg p.o. daily to 400 mg p.o. daily, we do need to recheck uric acid level to assure control and also to monitor renal function, labs ordered for patient today continue allopurinol at 400 mg p.o. daily  - CBC WITH DIFFERENTIAL; Future  - Comp Metabolic Panel; Future  - URIC ACID, SERUM  - REFERRAL TO NEURODIAGNOSTICS (EEG,EP,EMG/NCS/DBS) Modality Requested: EMG/NCS-Comment Extremities  - REFERRAL TO PHYSICAL THERAPY Reason for Therapy: Eval/Treat/Report    2. Encounter for monitoring allopurinol therapy  On allopurinol 400 mg p.o. daily, patient needs uric acid level and CMP checked about every 6 months, patient due we will schedule that for patient  - CBC WITH DIFFERENTIAL; Future  - Comp Metabolic Panel; Future  - URIC ACID, SERUM  - REFERRAL TO NEURODIAGNOSTICS (EEG,EP,EMG/NCS/DBS) Modality Requested: EMG/NCS-Comment Extremities    3. Carpal tunnel syndrome on both sides  Clinical symptoms of numbness in thumb and index finger bilaterally, will do EMG/NCV bilateral upper extremities for evaluation of carpal tunnel, patient may be candidate for corticosteroid injections or possible surgical decompression  - CBC WITH DIFFERENTIAL; Future  - Comp Metabolic Panel; Future  - URIC ACID, SERUM  - REFERRAL TO NEURODIAGNOSTICS (EEG,EP,EMG/NCS/DBS) Modality Requested: EMG/NCS-Comment  Extremities    4. Chronic bilateral low back pain, with sciatica presence unspecified  Past x-rays indicate mild compression we will redo back x-rays to assure no further compression or other pathology will refer to physical therapy for weight loss and core muscle strengthening  - REFERRAL TO PHYSICAL THERAPY Reason for Therapy: Eval/Treat/Report  -LS spine x-rays    5. Ulcerative colitis without complications, unspecified location (HCC)  Followed by GI and under control  - REFERRAL TO PHYSICAL THERAPY Reason for Therapy: Eval/Treat/Report    6. Sleep apnea, unspecified type  Patient states he is not really consistent with using his CPAP machine  - REFERRAL TO PHYSICAL THERAPY Reason for Therapy: Eval/Treat/Report    7. Type 2 diabetes mellitus with hyperglycemia, without long-term current use of insulin (HCC)  High blood sugar can exacerbate inflammatory state of patient's arthritis, discussed with patient the need to monitor and control blood sugars, patient states understanding  - REFERRAL TO NEURODIAGNOSTICS (EEG,EP,EMG/NCS/DBS) Modality Requested: EMG/NCS-Comment Extremities  - REFERRAL TO PHYSICAL THERAPY Reason for Therapy: Eval/Treat/Report    Followup: Return in about 6 months (around 9/25/2019). or sooner prn Darold Duane Mehlhaff  was seen 30 minutes face-to-face of which more than 50% of the time was spent counseling the patient (excluding time for procedures)  regarding  rheumatological condition and care. Therapy was discussed in detail.      Please note that this dictation was created using voice recognition software. I have made every reasonable attempt to correct obvious errors, but I expect that there are errors of grammar and possibly content that I did not discover before finalizing the note.

## 2019-03-25 NOTE — LETTER
[facility]   [address] [place] [address], [address]  [place] [address]  Phone: [phone]  Fax: [phone]              Encounter Date: 3/25/2019    Dear Dr. De La Rosa ref. provider found,    It was a pleasure seeing your patient, Darold Duane Mehlhaff, on 3/25/2019. Diagnoses of Idiopathic chronic gout of multiple sites without tophus, Encounter for monitoring allopurinol therapy, Carpal tunnel syndrome on both sides, Chronic bilateral low back pain, with sciatica presence unspecified, Ulcerative colitis without complications, unspecified location (HCC), Sleep apnea, unspecified type, and Type 2 diabetes mellitus with hyperglycemia, without long-term current use of insulin (HCC) were pertinent to this visit.     Please find attached progress note which includes the history I obtained from Mr. Ho, my physical examination findings, my impression and recommendations.      Once again, it was a pleasure participating in your patient's care.  Please feel free to contact me if you have any questions or if I can be of any further assistance to your patients.      Sincerely,    Sabrina Pino M.D.  Electronically Signed          PROGRESS NOTE:  Chief Complaint- joint pain    Subjective:   Darold Duane Mehlhaff is a 73 y.o. male here today for follow up of rheumatological issues    This is a follow[facility]up visit for [facility] who we [facility] in this clinic for gout and DJD.  Patient is currently on allopurinol at 400 mg p.o. daily with excellent control of his gout.  Patient denies any side effects from the medication, denies any unexplained weight loss, denies any fevers of unknown etiology, denies any GI upset, denies any rashes, denies any new joint swelling, denies recurrent infections.     Patient is complaining of other issues today is complaining of numbness in the thumb and index finger bilaterally, has never had issues with carpal tunnel in the past,     Patient also complaining of chronic  low back pain exacerbation last x-rays done March 2016 indicate mild compression and DJD.  Patient's not done any kind of physical therapy, does have some mild central obesity does take meloxicam as needed.         Additional comorbidities include a right TKA within the last week.  Pt also with DM, and also has sleep apnea currently uses a CPAP machine. Pt also has  ulcerative colitis, pt takes Apriso/melasma and states that the UC is under very good control.  Patient gets colonoscopies every 2 years to follow up on the ulcers colitis.         Right TKA     Uric acid 6.0 8/2013 [facility]; Uric acid 8.4 elevated 3/2016; Uric acid 6.8 5/2017; Uric acid 5.2 6/2018  RF neg 3/2016  CCP neg 3/2016  SI joints 3/2016- normal   DEXA 4/1/2016 T scores 2.3, 0.1    FRAX 4/1/2016 major osteoporotic fracture risk 4.1%, hip fracture risk 0.3%     Current medicines (including changes today)  Current Outpatient Prescriptions   Medication Sig Dispense Refill   • tizanidine (ZANAFLEX) 2 MG capsule 1 tabs po qhs for back spasm 901 Cap 1   • allopurinol (ZYLOPRIM) 300 MG Tab 1 tab po qday, take with one 100 mg tab for a total of 400 mg/day 90 Tab 1   • allopurinol (ZYLOPRIM) 100 MG Tab 1 tab po qday, take with one 300 mg tab for a total of 400 mg/day 90 Tab 1   • amlodipine (NORVASC) 2.5 MG Tab Take 2.5 mg by mouth every day.     • tamsulosin (FLOMAX) 0.4 MG capsule Take 0.8 mg by mouth ONE-HALF HOUR AFTER BREAKFAST.     • atorvastatin (LIPITOR) 10 MG Tab Take 10 mg by mouth every day.     • metformin (GLUCOPHAGE) 500 MG Tab Take 500 mg by mouth every day.     • Mesalamine 0.375 GM CAPSULE SR 24 HR Take 4 Caps by mouth every day.     • aspirin EC 81 MG EC tablet Take 1 Tab by mouth 2 times a day. 30 Tab    • docusate sodium 100 MG Cap Take 100 mg by mouth 2 times a day. 60 Cap    • hydrocodone-acetaminophen (NORCO) 5-325 MG Tab per tablet Take 1-2 Tabs by mouth every four hours as needed. (Patient not taking: Reported on 3/25/2019) 75 Tab 0    • Cyanocobalamin (VITAMIN B 12 PO) Take  by mouth every day.     • Cholecalciferol (VITAMIN D PO) Take 1 Tab by mouth every day.     • meloxicam (MOBIC) 15 MG tablet 1 tab po qday for joint pain (Patient not taking: Reported on 3/25/2019) 30 Tab 0     No current facility-administered medications for this visit.      He  has a past medical history of Arthritis (09/14/2017); Diabetes (HCC); High cholesterol; Hypertension; Pain; Renal disorder; Sleep apnea; and Snoring.    ROS   Other than what is mentioned in HPI or physical exam, there is no history of headaches, double vision or blurred vision. No temporal tenderness or jaw claudication. No history of cataracts or glaucoma. No trouble swallowing difficulties or sore throats.  No chest complaints including chest pain, cough or sputum production. No GI complaints including nausea, vomiting, change in bowel habits, or past peptic ulcer disease. No history of blood in the stools. No urinary complaints including dysuria or frequency. No history of alopecia, photosensitivity, oral ulcerations, Raynaud's phenomena.       Objective:     Blood pressure 140/80, pulse 76, temperature 36.2 °C (97.2 °F), temperature source Temporal, resp. rate 14, weight 119.2 kg (262 lb 12.6 oz), SpO2 95 %. Body mass index is 35.64 kg/m².   Physical Exam:    Constitutional: Alert and oriented X3, patient is talkative with good eye contact.Skin: Warm, dry, good turgor, no rashes in visible areas, no psoriatic lesions noted.Eye: Equal, round and reactive, conjunctiva clear, lids normal EOM intactENMT: Lips without lesions, good dentition, no oropharyngeal ulcers, moist buccal mucosa, pinna without deformityNeck: Trachea midline, no masses, no thyromegaly.Lymph:  No cervical lymphadenopathy, no axillary lymphadenopathy, no supraclavicular lymphadenopathyRespiratory: Unlabored respiratory effort, lungs clear to auscultation, no wheezes, no ronchi.Cardiovascular: Normal S1, S2, no murmur, no  edema.Abdomen: Soft, non-tender, no masses, no hepatosplenomegaly.Psych: Alert and oriented x3, normal affect and mood.Neuro: Cranial nerves 2-12 are grossly intact, no loss of sensation LEExt:no joint laxity noted in bilateral arms, no joint laxity noted in bilateral legs, no tophi no nodules no dactylitis no crossover toes no splay toes, patient does have some mild josselyn-lumbar spine tenderness but no step-offs noted, did not see any thenar or hyperthenar eminence atrophy    Lab Results   Component Value Date/Time    SODIUM 140 03/04/2019 08:13 AM    POTASSIUM 4.7 03/04/2019 08:13 AM    CHLORIDE 107 03/04/2019 08:13 AM    CO2 27 03/04/2019 08:13 AM    GLUCOSE 143 (H) 03/04/2019 08:13 AM    BUN 17 03/04/2019 08:13 AM    CREATININE 1.06 03/04/2019 08:13 AM      Lab Results   Component Value Date/Time    WBC 4.8 06/21/2018 09:34 AM    RBC 4.61 (L) 06/21/2018 09:34 AM    HEMOGLOBIN 15.2 06/21/2018 09:34 AM    HEMATOCRIT 45.0 06/21/2018 09:34 AM    MCV 97.6 06/21/2018 09:34 AM    MCH 33.0 06/21/2018 09:34 AM    MCHC 33.8 06/21/2018 09:34 AM    MPV 9.5 06/21/2018 09:34 AM    NEUTSPOLYS 56.00 06/21/2018 09:34 AM    LYMPHOCYTES 33.80 06/21/2018 09:34 AM    MONOCYTES 7.70 06/21/2018 09:34 AM    EOSINOPHILS 1.70 06/21/2018 09:34 AM    BASOPHILS 0.60 06/21/2018 09:34 AM      Lab Results   Component Value Date/Time    CALCIUM 10.3 03/04/2019 08:13 AM    ASTSGOT 16 03/04/2019 08:13 AM    ALTSGPT 21 03/04/2019 08:13 AM    ALKPHOSPHAT 64 03/04/2019 08:13 AM    TBILIRUBIN 0.9 03/04/2019 08:13 AM    ALBUMIN 4.5 03/04/2019 08:13 AM    TOTPROTEIN 7.4 03/04/2019 08:13 AM     Lab Results   Component Value Date/Time    URICACID 5.2 06/21/2018 09:34 AM    RHEUMFACTN <10 03/16/2016 08:34 AM    CCPANTIBODY 3 03/16/2016 08:34 AM     Lab Results   Component Value Date/Time    SEDRATEWES 2 11/21/2016 03:21 PM     Lab Results   Component Value Date/Time    HBA1C 6.5 (H) 06/21/2018 09:34 AM     Results for orders placed during the hospital  encounter of 04/01/16   DS-BONE DENSITY STUDY (DEXA)    Impression According to the World Health Organization classification, bone mineral density of this patient is normal.        10-year Probability of Fracture:  Major Osteoporotic     4.1%  Hip     0.3%  Population      [place] ()    Based on left femur neck BMD          INTERPRETING LOCATION:  [address] [place] [address], [place], [address]     Results for orders placed during the hospital encounter of 06/14/17   DX-KNEES-AP BILATERAL STANDING    Impression Osteoarthropathy primarily involving the medial compartments, right greater than left.     Results for orders placed during the hospital encounter of 06/14/17   DX-KNEES-AP BILATERAL STANDING    Impression Osteoarthropathy primarily involving the medial compartments, right greater than left.     Results for orders placed during the hospital encounter of 03/16/16   DX-SACROILIAC JOINTS 3+    Impression Negative exam of the sacroiliac joints.     Results for orders placed during the hospital encounter of 06/14/17   MR-KNEE-W/O RIGHT    Impression 1.  Severe osteoarthritis with extensive full-thickness articular cartilage loss in the medial compartment.    2.  Complex degenerative tear in the posterior horn and body of the medial meniscus.    3.  Complex tear in the posterior horn of the lateral meniscus near the meniscal root.    4.  Mucoid degeneration of the anterior cruciate ligament.    5.  Tendinopathy of the popliteus tendon.    6.  Lobulated low T1, high T2 signal lesion in the medial femoral condyle is consistent with a benign cartilaginous lesion.    7.  Small knee effusion with synovitis.    8.  Moderate size Baker cyst.     Assessment and Plan:     1. Idiopathic chronic gout of multiple sites without tophus  Last uric acid level is little bit high but we had increased allopurinol from 300 mg p.o. daily to 400 mg p.o. daily, we do need to recheck uric acid level to assure control and also to monitor  renal function, labs ordered for patient today continue allopurinol at 400 mg p.o. daily  - CBC WITH DIFFERENTIAL; Future  - Comp Metabolic Panel; Future  - URIC ACID, SERUM  - REFERRAL TO NEURODIAGNOSTICS (EEG,EP,EMG/NCS/DBS) Modality Requested: EMG/NCS-Comment Extremities  - REFERRAL TO PHYSICAL THERAPY Reason for Therapy: Eval/Treat/Report    2. Encounter for monitoring allopurinol therapy  On allopurinol 400 mg p.o. daily, patient needs uric acid level and CMP checked about every 6 months, patient due we will schedule that for patient  - CBC WITH DIFFERENTIAL; Future  - Comp Metabolic Panel; Future  - URIC ACID, SERUM  - REFERRAL TO NEURODIAGNOSTICS (EEG,EP,EMG/NCS/DBS) Modality Requested: EMG/NCS-Comment Extremities    3. Carpal tunnel syndrome on both sides  Clinical symptoms of numbness in thumb and index finger bilaterally, will do EMG/NCV bilateral upper extremities for evaluation of carpal tunnel, patient may be candidate for corticosteroid injections or possible surgical decompression  - CBC WITH DIFFERENTIAL; Future  - Comp Metabolic Panel; Future  - URIC ACID, SERUM  - REFERRAL TO NEURODIAGNOSTICS (EEG,EP,EMG/NCS/DBS) Modality Requested: EMG/NCS-Comment Extremities    4. Chronic bilateral low back pain, with sciatica presence unspecified  Past x-rays indicate mild compression we will redo back x-rays to assure no further compression or other pathology will refer to physical therapy for weight loss and core muscle strengthening  - REFERRAL TO PHYSICAL THERAPY Reason for Therapy: Eval/Treat/Report  -LS spine x-rays    5. Ulcerative colitis without complications, unspecified location (HCC)  Followed by GI and under control  - REFERRAL TO PHYSICAL THERAPY Reason for Therapy: Eval/Treat/Report    6. Sleep apnea, unspecified type  Patient states he is not really consistent with using his CPAP machine  - REFERRAL TO PHYSICAL THERAPY Reason for Therapy: Eval/Treat/Report    7. Type 2 diabetes mellitus with  hyperglycemia, without long-term current use of insulin (HCC)  High blood sugar can exacerbate inflammatory state of patient's arthritis, discussed with patient the need to monitor and control blood sugars, patient states understanding  - REFERRAL TO NEURODIAGNOSTICS (EEG,EP,EMG/NCS/DBS) Modality Requested: EMG/NCS-Comment Extremities  - REFERRAL TO PHYSICAL THERAPY Reason for Therapy: Eval/Treat/Report    Followup: Return in about 6 months (around 9/25/2019). or sooner prn Darold Duane Mehlhaff  was seen 30 minutes face-to-face of which more than 50% of the time was spent counseling the patient (excluding time for procedures)  regarding  rheumatological condition and care. Therapy was discussed in detail.      Please note that this dictation was created using voice recognition software. I have made every reasonable attempt to correct obvious errors, but I expect that there are errors of grammar and possibly content that I did not discover before finalizing the note.

## 2019-03-26 NOTE — TELEPHONE ENCOUNTER
[facility] patient-Please notify patient that I have also reordered a back x-ray, patient was complaining of low back pain on his visit 3/25/2019 a getting worse, last x-ray did show a mild compression reordered low back x-ray for evaluation of progression

## 2019-03-28 ENCOUNTER — HOSPITAL ENCOUNTER (OUTPATIENT)
Dept: LAB | Facility: MEDICAL CENTER | Age: 74
End: 2019-03-28
Attending: INTERNAL MEDICINE
Payer: MEDICARE

## 2019-03-28 DIAGNOSIS — G56.03 CARPAL TUNNEL SYNDROME ON BOTH SIDES: ICD-10-CM

## 2019-03-28 DIAGNOSIS — M1A.09X0 IDIOPATHIC CHRONIC GOUT OF MULTIPLE SITES WITHOUT TOPHUS: ICD-10-CM

## 2019-03-28 DIAGNOSIS — Z51.81 ENCOUNTER FOR MONITORING ALLOPURINOL THERAPY: ICD-10-CM

## 2019-03-28 DIAGNOSIS — Z79.899 ENCOUNTER FOR MONITORING ALLOPURINOL THERAPY: ICD-10-CM

## 2019-03-28 LAB
ALBUMIN SERPL BCP-MCNC: 4.3 G/DL (ref 3.2–4.9)
ALBUMIN/GLOB SERPL: 1.8 G/DL
ALP SERPL-CCNC: 64 U/L (ref 30–99)
ALT SERPL-CCNC: 25 U/L (ref 2–50)
ANION GAP SERPL CALC-SCNC: 5 MMOL/L (ref 0–11.9)
AST SERPL-CCNC: 16 U/L (ref 12–45)
BASOPHILS # BLD AUTO: 0.4 % (ref 0–1.8)
BASOPHILS # BLD: 0.02 K/UL (ref 0–0.12)
BILIRUB SERPL-MCNC: 0.7 MG/DL (ref 0.1–1.5)
BUN SERPL-MCNC: 16 MG/DL (ref 8–22)
CALCIUM SERPL-MCNC: 9.7 MG/DL (ref 8.5–10.5)
CHLORIDE SERPL-SCNC: 102 MMOL/L (ref 96–112)
CO2 SERPL-SCNC: 28 MMOL/L (ref 20–33)
CREAT SERPL-MCNC: 0.95 MG/DL (ref 0.5–1.4)
EOSINOPHIL # BLD AUTO: 0.07 K/UL (ref 0–0.51)
EOSINOPHIL NFR BLD: 1.5 % (ref 0–6.9)
ERYTHROCYTE [DISTWIDTH] IN BLOOD BY AUTOMATED COUNT: 47.4 FL (ref 35.9–50)
GLOBULIN SER CALC-MCNC: 2.4 G/DL (ref 1.9–3.5)
GLUCOSE SERPL-MCNC: 266 MG/DL (ref 65–99)
HCT VFR BLD AUTO: 44.3 % (ref 42–52)
HGB BLD-MCNC: 15.1 G/DL (ref 14–18)
IMM GRANULOCYTES # BLD AUTO: 0.01 K/UL (ref 0–0.11)
IMM GRANULOCYTES NFR BLD AUTO: 0.2 % (ref 0–0.9)
LYMPHOCYTES # BLD AUTO: 1.37 K/UL (ref 1–4.8)
LYMPHOCYTES NFR BLD: 29.2 % (ref 22–41)
MCH RBC QN AUTO: 34.1 PG (ref 27–33)
MCHC RBC AUTO-ENTMCNC: 34.1 G/DL (ref 33.7–35.3)
MCV RBC AUTO: 100 FL (ref 81.4–97.8)
MONOCYTES # BLD AUTO: 0.33 K/UL (ref 0–0.85)
MONOCYTES NFR BLD AUTO: 7 % (ref 0–13.4)
NEUTROPHILS # BLD AUTO: 2.89 K/UL (ref 1.82–7.42)
NEUTROPHILS NFR BLD: 61.7 % (ref 44–72)
NRBC # BLD AUTO: 0 K/UL
NRBC BLD-RTO: 0 /100 WBC
PLATELET # BLD AUTO: 175 K/UL (ref 164–446)
PMV BLD AUTO: 10.6 FL (ref 9–12.9)
POTASSIUM SERPL-SCNC: 4.1 MMOL/L (ref 3.6–5.5)
PROT SERPL-MCNC: 6.7 G/DL (ref 6–8.2)
RBC # BLD AUTO: 4.43 M/UL (ref 4.7–6.1)
SODIUM SERPL-SCNC: 135 MMOL/L (ref 135–145)
URATE SERPL-MCNC: 4.8 MG/DL (ref 2.5–8.3)
WBC # BLD AUTO: 4.7 K/UL (ref 4.8–10.8)

## 2019-03-28 PROCEDURE — 36415 COLL VENOUS BLD VENIPUNCTURE: CPT

## 2019-03-28 PROCEDURE — 80053 COMPREHEN METABOLIC PANEL: CPT

## 2019-03-28 PROCEDURE — 85025 COMPLETE CBC W/AUTO DIFF WBC: CPT

## 2019-03-28 PROCEDURE — 84550 ASSAY OF BLOOD/URIC ACID: CPT

## 2019-04-03 ENCOUNTER — HOSPITAL ENCOUNTER (OUTPATIENT)
Dept: RADIOLOGY | Facility: MEDICAL CENTER | Age: 74
End: 2019-04-03
Attending: INTERNAL MEDICINE
Payer: MEDICARE

## 2019-04-03 ENCOUNTER — TELEPHONE (OUTPATIENT)
Dept: RHEUMATOLOGY | Facility: MEDICAL CENTER | Age: 74
End: 2019-04-03

## 2019-04-03 DIAGNOSIS — M54.5 CHRONIC BILATERAL LOW BACK PAIN, WITH SCIATICA PRESENCE UNSPECIFIED: ICD-10-CM

## 2019-04-03 DIAGNOSIS — G89.29 CHRONIC BILATERAL LOW BACK PAIN, WITH SCIATICA PRESENCE UNSPECIFIED: ICD-10-CM

## 2019-04-03 DIAGNOSIS — M1A.09X0 IDIOPATHIC CHRONIC GOUT OF MULTIPLE SITES WITHOUT TOPHUS: ICD-10-CM

## 2019-04-03 PROCEDURE — 72100 X-RAY EXAM L-S SPINE 2/3 VWS: CPT

## 2019-04-03 NOTE — TELEPHONE ENCOUNTER
Please notify patient that we got the results of his back x-ray, nothing new but there does show some compression of some of his vertebra which indicates osteoporosis    [facility], [facility] and treatment for osteoporosis.

## 2019-04-09 NOTE — TELEPHONE ENCOUNTER
I called and spoke with Christopher's wife. She stated he is on a two week camping trip with no cell service. She will have him call our office when he gets bacl home. Thank you

## 2019-09-26 ENCOUNTER — APPOINTMENT (OUTPATIENT)
Dept: RHEUMATOLOGY | Facility: MEDICAL CENTER | Age: 74
End: 2019-09-26
Payer: MEDICARE

## 2021-01-15 DIAGNOSIS — Z23 NEED FOR VACCINATION: ICD-10-CM

## 2021-01-26 ENCOUNTER — IMMUNIZATION (OUTPATIENT)
Dept: FAMILY PLANNING/WOMEN'S HEALTH CLINIC | Facility: IMMUNIZATION CENTER | Age: 76
End: 2021-01-26
Payer: MEDICARE

## 2021-01-26 ENCOUNTER — APPOINTMENT (OUTPATIENT)
Dept: FAMILY PLANNING/WOMEN'S HEALTH CLINIC | Facility: IMMUNIZATION CENTER | Age: 76
End: 2021-01-26
Attending: INTERNAL MEDICINE
Payer: MEDICARE

## 2021-01-26 DIAGNOSIS — Z23 ENCOUNTER FOR VACCINATION: Primary | ICD-10-CM

## 2021-01-26 DIAGNOSIS — Z23 NEED FOR VACCINATION: ICD-10-CM

## 2021-01-26 PROCEDURE — 91300 PFIZER SARS-COV-2 VACCINE: CPT | Performed by: PHYSICIAN ASSISTANT

## 2021-01-26 PROCEDURE — 0001A PFIZER SARS-COV-2 VACCINE: CPT | Performed by: PHYSICIAN ASSISTANT

## 2021-02-18 ENCOUNTER — IMMUNIZATION (OUTPATIENT)
Dept: FAMILY PLANNING/WOMEN'S HEALTH CLINIC | Facility: IMMUNIZATION CENTER | Age: 76
End: 2021-02-18
Attending: INTERNAL MEDICINE
Payer: MEDICARE

## 2021-02-18 DIAGNOSIS — Z23 ENCOUNTER FOR VACCINATION: Primary | ICD-10-CM

## 2021-02-18 PROCEDURE — 0002A PFIZER SARS-COV-2 VACCINE: CPT

## 2021-02-18 PROCEDURE — 91300 PFIZER SARS-COV-2 VACCINE: CPT

## 2021-05-05 ENCOUNTER — PATIENT MESSAGE (OUTPATIENT)
Dept: HEALTH INFORMATION MANAGEMENT | Facility: OTHER | Age: 76
End: 2021-05-05

## 2021-06-01 ENCOUNTER — PATIENT OUTREACH (OUTPATIENT)
Dept: HEALTH INFORMATION MANAGEMENT | Facility: OTHER | Age: 76
End: 2021-06-01

## 2021-06-01 NOTE — PROGRESS NOTES
Outcome: Left Message    Please transfer to Patient Outreach Team at [phone] when patient returns call.    WebIZ Checked & Epic Updated:  no    [facility]Connect Verified: no    Attempt # 1

## 2022-01-05 ENCOUNTER — HOSPITAL ENCOUNTER (OUTPATIENT)
Dept: LAB | Facility: MEDICAL CENTER | Age: 77
End: 2022-01-05
Attending: INTERNAL MEDICINE
Payer: MEDICARE

## 2022-01-05 LAB
ALBUMIN SERPL BCP-MCNC: 4.1 G/DL (ref 3.2–4.9)
ALBUMIN/GLOB SERPL: 1.5 G/DL
ALP SERPL-CCNC: 64 U/L (ref 30–99)
ALT SERPL-CCNC: 13 U/L (ref 2–50)
ANION GAP SERPL CALC-SCNC: 12 MMOL/L (ref 7–16)
AST SERPL-CCNC: 11 U/L (ref 12–45)
BASOPHILS # BLD AUTO: 0.5 % (ref 0–1.8)
BASOPHILS # BLD: 0.03 K/UL (ref 0–0.12)
BILIRUB SERPL-MCNC: 0.5 MG/DL (ref 0.1–1.5)
BUN SERPL-MCNC: 17 MG/DL (ref 8–22)
CALCIUM SERPL-MCNC: 9.6 MG/DL (ref 8.5–10.5)
CHLORIDE SERPL-SCNC: 106 MMOL/L (ref 96–112)
CO2 SERPL-SCNC: 21 MMOL/L (ref 20–33)
CREAT SERPL-MCNC: 1.05 MG/DL (ref 0.5–1.4)
CRP SERPL HS-MCNC: <0.3 MG/DL (ref 0–0.75)
EOSINOPHIL # BLD AUTO: 0.1 K/UL (ref 0–0.51)
EOSINOPHIL NFR BLD: 1.7 % (ref 0–6.9)
ERYTHROCYTE [DISTWIDTH] IN BLOOD BY AUTOMATED COUNT: 47.5 FL (ref 35.9–50)
GLOBULIN SER CALC-MCNC: 2.7 G/DL (ref 1.9–3.5)
GLUCOSE SERPL-MCNC: 107 MG/DL (ref 65–99)
HBV CORE AB SERPL QL IA: NONREACTIVE
HBV SURFACE AB SERPL IA-ACNC: <3.5 MIU/ML (ref 0–10)
HBV SURFACE AG SER QL: NORMAL
HCT VFR BLD AUTO: 46.2 % (ref 42–52)
HCV AB SER QL: NORMAL
HGB BLD-MCNC: 15.7 G/DL (ref 14–18)
IMM GRANULOCYTES # BLD AUTO: 0.02 K/UL (ref 0–0.11)
IMM GRANULOCYTES NFR BLD AUTO: 0.3 % (ref 0–0.9)
LYMPHOCYTES # BLD AUTO: 1.63 K/UL (ref 1–4.8)
LYMPHOCYTES NFR BLD: 27.9 % (ref 22–41)
MCH RBC QN AUTO: 34.1 PG (ref 27–33)
MCHC RBC AUTO-ENTMCNC: 34 G/DL (ref 33.7–35.3)
MCV RBC AUTO: 100.2 FL (ref 81.4–97.8)
MONOCYTES # BLD AUTO: 0.48 K/UL (ref 0–0.85)
MONOCYTES NFR BLD AUTO: 8.2 % (ref 0–13.4)
NEUTROPHILS # BLD AUTO: 3.59 K/UL (ref 1.82–7.42)
NEUTROPHILS NFR BLD: 61.4 % (ref 44–72)
NRBC # BLD AUTO: 0 K/UL
NRBC BLD-RTO: 0 /100 WBC
PLATELET # BLD AUTO: 173 K/UL (ref 164–446)
PMV BLD AUTO: 10.1 FL (ref 9–12.9)
POTASSIUM SERPL-SCNC: 4.7 MMOL/L (ref 3.6–5.5)
PROT SERPL-MCNC: 6.8 G/DL (ref 6–8.2)
RBC # BLD AUTO: 4.61 M/UL (ref 4.7–6.1)
SODIUM SERPL-SCNC: 139 MMOL/L (ref 135–145)
WBC # BLD AUTO: 5.9 K/UL (ref 4.8–10.8)

## 2022-01-05 PROCEDURE — 80053 COMPREHEN METABOLIC PANEL: CPT

## 2022-01-05 PROCEDURE — 86140 C-REACTIVE PROTEIN: CPT

## 2022-01-05 PROCEDURE — 86706 HEP B SURFACE ANTIBODY: CPT

## 2022-01-05 PROCEDURE — 86480 TB TEST CELL IMMUN MEASURE: CPT

## 2022-01-05 PROCEDURE — 36415 COLL VENOUS BLD VENIPUNCTURE: CPT

## 2022-01-05 PROCEDURE — 87340 HEPATITIS B SURFACE AG IA: CPT

## 2022-01-05 PROCEDURE — 86704 HEP B CORE ANTIBODY TOTAL: CPT

## 2022-01-05 PROCEDURE — 86803 HEPATITIS C AB TEST: CPT

## 2022-01-05 PROCEDURE — 85025 COMPLETE CBC W/AUTO DIFF WBC: CPT

## 2022-01-06 LAB
GAMMA INTERFERON BACKGROUND BLD IA-ACNC: 0.02 IU/ML
M TB IFN-G BLD-IMP: NEGATIVE
M TB IFN-G CD4+ BCKGRND COR BLD-ACNC: 0.01 IU/ML
MITOGEN IGNF BCKGRD COR BLD-ACNC: >10 IU/ML
QFT TB2 - NIL TBQ2: 0 IU/ML

## 2022-01-11 RX ORDER — SODIUM CHLORIDE 9 MG/ML
INJECTION, SOLUTION INTRAVENOUS CONTINUOUS
Status: CANCELLED | OUTPATIENT
Start: 2022-01-18

## 2022-01-11 RX ORDER — 0.9 % SODIUM CHLORIDE 0.9 %
VIAL (ML) INJECTION PRN
Status: CANCELLED | OUTPATIENT
Start: 2022-01-18

## 2022-01-11 RX ORDER — HEPARIN SODIUM (PORCINE) LOCK FLUSH IV SOLN 100 UNIT/ML 100 UNIT/ML
500 SOLUTION INTRAVENOUS PRN
Status: CANCELLED | OUTPATIENT
Start: 2022-01-18

## 2022-01-11 RX ORDER — 0.9 % SODIUM CHLORIDE 0.9 %
10 VIAL (ML) INJECTION PRN
Status: CANCELLED | OUTPATIENT
Start: 2022-01-18

## 2022-01-11 RX ORDER — 0.9 % SODIUM CHLORIDE 0.9 %
3 VIAL (ML) INJECTION PRN
Status: CANCELLED | OUTPATIENT
Start: 2022-01-18

## 2022-02-15 ENCOUNTER — OUTPATIENT INFUSION SERVICES (OUTPATIENT)
Dept: ONCOLOGY | Facility: MEDICAL CENTER | Age: 77
End: 2022-02-15
Attending: INTERNAL MEDICINE
Payer: MEDICARE

## 2022-02-15 VITALS
BODY MASS INDEX: 38.37 KG/M2 | WEIGHT: 259.04 LBS | HEART RATE: 91 BPM | TEMPERATURE: 97.5 F | OXYGEN SATURATION: 98 % | HEIGHT: 69 IN | DIASTOLIC BLOOD PRESSURE: 99 MMHG | RESPIRATION RATE: 18 BRPM | SYSTOLIC BLOOD PRESSURE: 169 MMHG

## 2022-02-15 DIAGNOSIS — K51.90 ULCERATIVE COLITIS WITHOUT COMPLICATIONS, UNSPECIFIED LOCATION (HCC): ICD-10-CM

## 2022-02-15 PROCEDURE — 700105 HCHG RX REV CODE 258: Performed by: INTERNAL MEDICINE

## 2022-02-15 PROCEDURE — 96415 CHEMO IV INFUSION ADDL HR: CPT

## 2022-02-15 PROCEDURE — 700111 HCHG RX REV CODE 636 W/ 250 OVERRIDE (IP): Performed by: INTERNAL MEDICINE

## 2022-02-15 PROCEDURE — 96413 CHEMO IV INFUSION 1 HR: CPT

## 2022-02-15 RX ORDER — SODIUM CHLORIDE 9 MG/ML
INJECTION, SOLUTION INTRAVENOUS CONTINUOUS
Status: CANCELLED | OUTPATIENT
Start: 2022-03-01

## 2022-02-15 RX ORDER — 0.9 % SODIUM CHLORIDE 0.9 %
10 VIAL (ML) INJECTION PRN
Status: CANCELLED | OUTPATIENT
Start: 2022-03-01

## 2022-02-15 RX ORDER — 0.9 % SODIUM CHLORIDE 0.9 %
VIAL (ML) INJECTION PRN
Status: CANCELLED | OUTPATIENT
Start: 2022-03-01

## 2022-02-15 RX ORDER — 0.9 % SODIUM CHLORIDE 0.9 %
3 VIAL (ML) INJECTION PRN
Status: CANCELLED | OUTPATIENT
Start: 2022-03-01

## 2022-02-15 RX ORDER — SODIUM CHLORIDE 9 MG/ML
INJECTION, SOLUTION INTRAVENOUS CONTINUOUS
Status: DISCONTINUED | OUTPATIENT
Start: 2022-02-15 | End: 2022-02-15 | Stop reason: HOSPADM

## 2022-02-15 RX ORDER — HEPARIN SODIUM (PORCINE) LOCK FLUSH IV SOLN 100 UNIT/ML 100 UNIT/ML
500 SOLUTION INTRAVENOUS PRN
Status: CANCELLED | OUTPATIENT
Start: 2022-03-01

## 2022-02-15 RX ADMIN — SODIUM CHLORIDE 600 MG: 9 INJECTION, SOLUTION INTRAVENOUS at 08:28

## 2022-02-15 ASSESSMENT — FIBROSIS 4 INDEX: FIB4 SCORE: 1.3

## 2022-02-15 NOTE — PROGRESS NOTES
Christopher presents to [facility] for first dose Infliximab.  POC discussed, educational handout given, questions answered.   Christopher verbalizes understanding and agreement.  PIV placed to left AC, brisk blood return observed.  Infliximab administered per MAR over 2 hours, Christopher tolerated well.  PIV flushed and removed, gauze and coban to site.  Discharged in NAD, copy of appointment schedule provided, next appointment confirmed.

## 2022-03-01 ENCOUNTER — OUTPATIENT INFUSION SERVICES (OUTPATIENT)
Dept: ONCOLOGY | Facility: MEDICAL CENTER | Age: 77
End: 2022-03-01
Attending: INTERNAL MEDICINE
Payer: MEDICARE

## 2022-03-01 VITALS
RESPIRATION RATE: 18 BRPM | HEIGHT: 69 IN | SYSTOLIC BLOOD PRESSURE: 155 MMHG | DIASTOLIC BLOOD PRESSURE: 85 MMHG | WEIGHT: 257.94 LBS | HEART RATE: 80 BPM | BODY MASS INDEX: 38.2 KG/M2 | TEMPERATURE: 98 F | OXYGEN SATURATION: 98 %

## 2022-03-01 DIAGNOSIS — K51.90 ULCERATIVE COLITIS WITHOUT COMPLICATIONS, UNSPECIFIED LOCATION (HCC): ICD-10-CM

## 2022-03-01 PROCEDURE — 96415 CHEMO IV INFUSION ADDL HR: CPT

## 2022-03-01 PROCEDURE — 96413 CHEMO IV INFUSION 1 HR: CPT

## 2022-03-01 PROCEDURE — 700105 HCHG RX REV CODE 258: Performed by: INTERNAL MEDICINE

## 2022-03-01 PROCEDURE — 700111 HCHG RX REV CODE 636 W/ 250 OVERRIDE (IP): Performed by: INTERNAL MEDICINE

## 2022-03-01 RX ORDER — 0.9 % SODIUM CHLORIDE 0.9 %
3 VIAL (ML) INJECTION PRN
Status: CANCELLED | OUTPATIENT
Start: 2022-03-29

## 2022-03-01 RX ORDER — 0.9 % SODIUM CHLORIDE 0.9 %
10 VIAL (ML) INJECTION PRN
Status: CANCELLED | OUTPATIENT
Start: 2022-03-29

## 2022-03-01 RX ORDER — HEPARIN SODIUM (PORCINE) LOCK FLUSH IV SOLN 100 UNIT/ML 100 UNIT/ML
500 SOLUTION INTRAVENOUS PRN
Status: CANCELLED | OUTPATIENT
Start: 2022-03-29

## 2022-03-01 RX ORDER — 0.9 % SODIUM CHLORIDE 0.9 %
VIAL (ML) INJECTION PRN
Status: CANCELLED | OUTPATIENT
Start: 2022-03-29

## 2022-03-01 RX ORDER — SODIUM CHLORIDE 9 MG/ML
INJECTION, SOLUTION INTRAVENOUS CONTINUOUS
Status: DISCONTINUED | OUTPATIENT
Start: 2022-03-01 | End: 2022-03-01 | Stop reason: HOSPADM

## 2022-03-01 RX ORDER — SODIUM CHLORIDE 9 MG/ML
INJECTION, SOLUTION INTRAVENOUS CONTINUOUS
Status: CANCELLED | OUTPATIENT
Start: 2022-03-29

## 2022-03-01 RX ORDER — NAPROXEN SODIUM 220 MG
220 TABLET ORAL 2 TIMES DAILY PRN
COMMUNITY

## 2022-03-01 RX ADMIN — SODIUM CHLORIDE: 9 INJECTION, SOLUTION INTRAVENOUS at 15:39

## 2022-03-01 RX ADMIN — SODIUM CHLORIDE 600 MG: 9 INJECTION, SOLUTION INTRAVENOUS at 15:40

## 2022-03-01 ASSESSMENT — FIBROSIS 4 INDEX: FIB4 SCORE: 1.3

## 2022-03-02 NOTE — PROGRESS NOTES
Patient presented to [facility] for Infliximab  infusion.  Patient denied current illness/infection. Peripheral IV placed, flushed easily renea briskly. Infliximab infused over 2 hours  through inline filter. Patient tolerated infusion well. Peripheral IV removed, catheter tip remained intact. Gauze and coban dressing to site. Patient left [facility] in no apparent distress. Copy of appointment schedule given to patient prior to departure.

## 2022-03-21 ENCOUNTER — PATIENT MESSAGE (OUTPATIENT)
Dept: HEALTH INFORMATION MANAGEMENT | Facility: OTHER | Age: 77
End: 2022-03-21

## 2022-03-22 ENCOUNTER — HOSPITAL ENCOUNTER (OUTPATIENT)
Dept: LAB | Facility: MEDICAL CENTER | Age: 77
End: 2022-03-22
Attending: INTERNAL MEDICINE
Payer: MEDICARE

## 2022-03-22 LAB
ALBUMIN SERPL BCP-MCNC: 4.5 G/DL (ref 3.2–4.9)
ALBUMIN/GLOB SERPL: 1.7 G/DL
ALP SERPL-CCNC: 69 U/L (ref 30–99)
ALT SERPL-CCNC: 17 U/L (ref 2–50)
ANION GAP SERPL CALC-SCNC: 13 MMOL/L (ref 7–16)
AST SERPL-CCNC: 15 U/L (ref 12–45)
BASOPHILS # BLD AUTO: 0.8 % (ref 0–1.8)
BASOPHILS # BLD: 0.04 K/UL (ref 0–0.12)
BILIRUB SERPL-MCNC: 0.7 MG/DL (ref 0.1–1.5)
BUN SERPL-MCNC: 14 MG/DL (ref 8–22)
CALCIUM SERPL-MCNC: 9.6 MG/DL (ref 8.5–10.5)
CHLORIDE SERPL-SCNC: 103 MMOL/L (ref 96–112)
CO2 SERPL-SCNC: 25 MMOL/L (ref 20–33)
CREAT SERPL-MCNC: 1 MG/DL (ref 0.5–1.4)
CRP SERPL HS-MCNC: 0.31 MG/DL (ref 0–0.75)
EOSINOPHIL # BLD AUTO: 0.16 K/UL (ref 0–0.51)
EOSINOPHIL NFR BLD: 3.3 % (ref 0–6.9)
ERYTHROCYTE [DISTWIDTH] IN BLOOD BY AUTOMATED COUNT: 51.5 FL (ref 35.9–50)
GFR SERPLBLD CREATININE-BSD FMLA CKD-EPI: 79 ML/MIN/1.73 M 2
GLOBULIN SER CALC-MCNC: 2.6 G/DL (ref 1.9–3.5)
GLUCOSE SERPL-MCNC: 156 MG/DL (ref 65–99)
HCT VFR BLD AUTO: 45 % (ref 42–52)
HGB BLD-MCNC: 16 G/DL (ref 14–18)
IMM GRANULOCYTES # BLD AUTO: 0.01 K/UL (ref 0–0.11)
IMM GRANULOCYTES NFR BLD AUTO: 0.2 % (ref 0–0.9)
LYMPHOCYTES # BLD AUTO: 2.09 K/UL (ref 1–4.8)
LYMPHOCYTES NFR BLD: 43.1 % (ref 22–41)
MCH RBC QN AUTO: 35.9 PG (ref 27–33)
MCHC RBC AUTO-ENTMCNC: 35.6 G/DL (ref 33.7–35.3)
MCV RBC AUTO: 100.9 FL (ref 81.4–97.8)
MONOCYTES # BLD AUTO: 0.73 K/UL (ref 0–0.85)
MONOCYTES NFR BLD AUTO: 15.1 % (ref 0–13.4)
NEUTROPHILS # BLD AUTO: 1.82 K/UL (ref 1.82–7.42)
NEUTROPHILS NFR BLD: 37.5 % (ref 44–72)
NRBC # BLD AUTO: 0 K/UL
NRBC BLD-RTO: 0 /100 WBC
PLATELET # BLD AUTO: 167 K/UL (ref 164–446)
PMV BLD AUTO: 10.4 FL (ref 9–12.9)
POTASSIUM SERPL-SCNC: 5.1 MMOL/L (ref 3.6–5.5)
PROT SERPL-MCNC: 7.1 G/DL (ref 6–8.2)
RBC # BLD AUTO: 4.46 M/UL (ref 4.7–6.1)
SODIUM SERPL-SCNC: 141 MMOL/L (ref 135–145)
WBC # BLD AUTO: 4.9 K/UL (ref 4.8–10.8)

## 2022-03-22 PROCEDURE — 80053 COMPREHEN METABOLIC PANEL: CPT

## 2022-03-22 PROCEDURE — 85025 COMPLETE CBC W/AUTO DIFF WBC: CPT

## 2022-03-22 PROCEDURE — 86140 C-REACTIVE PROTEIN: CPT

## 2022-03-22 PROCEDURE — 36415 COLL VENOUS BLD VENIPUNCTURE: CPT

## 2022-03-29 ENCOUNTER — OUTPATIENT INFUSION SERVICES (OUTPATIENT)
Dept: ONCOLOGY | Facility: MEDICAL CENTER | Age: 77
End: 2022-03-29
Attending: INTERNAL MEDICINE
Payer: MEDICARE

## 2022-03-29 VITALS
TEMPERATURE: 98 F | WEIGHT: 263.89 LBS | BODY MASS INDEX: 39.09 KG/M2 | OXYGEN SATURATION: 96 % | RESPIRATION RATE: 18 BRPM | DIASTOLIC BLOOD PRESSURE: 88 MMHG | HEART RATE: 89 BPM | SYSTOLIC BLOOD PRESSURE: 138 MMHG | HEIGHT: 69 IN

## 2022-03-29 DIAGNOSIS — K51.90 ULCERATIVE COLITIS WITHOUT COMPLICATIONS, UNSPECIFIED LOCATION (HCC): ICD-10-CM

## 2022-03-29 PROCEDURE — 700105 HCHG RX REV CODE 258: Performed by: INTERNAL MEDICINE

## 2022-03-29 PROCEDURE — 96415 CHEMO IV INFUSION ADDL HR: CPT

## 2022-03-29 PROCEDURE — 700111 HCHG RX REV CODE 636 W/ 250 OVERRIDE (IP): Performed by: INTERNAL MEDICINE

## 2022-03-29 PROCEDURE — 96413 CHEMO IV INFUSION 1 HR: CPT

## 2022-03-29 RX ORDER — SODIUM CHLORIDE 9 MG/ML
INJECTION, SOLUTION INTRAVENOUS CONTINUOUS
Status: CANCELLED | OUTPATIENT
Start: 2022-05-24

## 2022-03-29 RX ORDER — 0.9 % SODIUM CHLORIDE 0.9 %
VIAL (ML) INJECTION PRN
Status: CANCELLED | OUTPATIENT
Start: 2022-05-24

## 2022-03-29 RX ORDER — 0.9 % SODIUM CHLORIDE 0.9 %
10 VIAL (ML) INJECTION PRN
Status: CANCELLED | OUTPATIENT
Start: 2022-05-24

## 2022-03-29 RX ORDER — HEPARIN SODIUM (PORCINE) LOCK FLUSH IV SOLN 100 UNIT/ML 100 UNIT/ML
500 SOLUTION INTRAVENOUS PRN
Status: CANCELLED | OUTPATIENT
Start: 2022-05-24

## 2022-03-29 RX ORDER — 0.9 % SODIUM CHLORIDE 0.9 %
3 VIAL (ML) INJECTION PRN
Status: CANCELLED | OUTPATIENT
Start: 2022-05-24

## 2022-03-29 RX ADMIN — SODIUM CHLORIDE 600 MG: 9 INJECTION, SOLUTION INTRAVENOUS at 15:43

## 2022-03-29 ASSESSMENT — FIBROSIS 4 INDEX: FIB4 SCORE: 1.61

## 2022-03-30 NOTE — PROGRESS NOTES
Pt arrives to [facility] for Inflectra for ulcerative colitis. Pt denies s/s of infection or worsening of symptoms.  Pt denies any loose/bloody stools.  PIV established to L-FA.   Inflectra infused over 2 hours without adverse reaction.  PIV flushed with NS and site removed.  Site wrapped with pressure dressing.  Confirmed next appt time with pt.  Pt dc home to self care.

## 2022-05-24 ENCOUNTER — OUTPATIENT INFUSION SERVICES (OUTPATIENT)
Dept: ONCOLOGY | Facility: MEDICAL CENTER | Age: 77
End: 2022-05-24
Attending: INTERNAL MEDICINE
Payer: MEDICARE

## 2022-05-24 VITALS
DIASTOLIC BLOOD PRESSURE: 89 MMHG | BODY MASS INDEX: 37.56 KG/M2 | WEIGHT: 262.35 LBS | HEIGHT: 70 IN | TEMPERATURE: 96.8 F | SYSTOLIC BLOOD PRESSURE: 150 MMHG | HEART RATE: 74 BPM | RESPIRATION RATE: 18 BRPM | OXYGEN SATURATION: 93 %

## 2022-05-24 DIAGNOSIS — K51.90 ULCERATIVE COLITIS WITHOUT COMPLICATIONS, UNSPECIFIED LOCATION (HCC): ICD-10-CM

## 2022-05-24 PROCEDURE — 96413 CHEMO IV INFUSION 1 HR: CPT

## 2022-05-24 PROCEDURE — 700111 HCHG RX REV CODE 636 W/ 250 OVERRIDE (IP): Performed by: INTERNAL MEDICINE

## 2022-05-24 PROCEDURE — 700105 HCHG RX REV CODE 258: Performed by: INTERNAL MEDICINE

## 2022-05-24 RX ORDER — 0.9 % SODIUM CHLORIDE 0.9 %
10 VIAL (ML) INJECTION PRN
Status: CANCELLED | OUTPATIENT
Start: 2022-07-19

## 2022-05-24 RX ORDER — SODIUM CHLORIDE 9 MG/ML
INJECTION, SOLUTION INTRAVENOUS CONTINUOUS
Status: CANCELLED | OUTPATIENT
Start: 2022-07-19

## 2022-05-24 RX ORDER — 0.9 % SODIUM CHLORIDE 0.9 %
VIAL (ML) INJECTION PRN
Status: CANCELLED | OUTPATIENT
Start: 2022-07-19

## 2022-05-24 RX ORDER — SODIUM CHLORIDE 9 MG/ML
INJECTION, SOLUTION INTRAVENOUS CONTINUOUS
Status: DISCONTINUED | OUTPATIENT
Start: 2022-05-24 | End: 2022-05-24 | Stop reason: HOSPADM

## 2022-05-24 RX ORDER — 0.9 % SODIUM CHLORIDE 0.9 %
3 VIAL (ML) INJECTION PRN
Status: CANCELLED | OUTPATIENT
Start: 2022-07-19

## 2022-05-24 RX ORDER — HEPARIN SODIUM (PORCINE) LOCK FLUSH IV SOLN 100 UNIT/ML 100 UNIT/ML
500 SOLUTION INTRAVENOUS PRN
Status: CANCELLED | OUTPATIENT
Start: 2022-07-19

## 2022-05-24 RX ADMIN — SODIUM CHLORIDE: 9 INJECTION, SOLUTION INTRAVENOUS at 08:59

## 2022-05-24 RX ADMIN — SODIUM CHLORIDE 600 MG: 9 INJECTION, SOLUTION INTRAVENOUS at 09:00

## 2022-05-24 ASSESSMENT — FIBROSIS 4 INDEX: FIB4 SCORE: 1.63

## 2022-05-24 NOTE — PROGRESS NOTES
Christopher arrived ambulatory to [facility] for Inflectra infusion for Ulcerative Colitis. POC discussed with pt and he agrees with plan. Pt with call light in reach for safety. Pt is watching TV.    PIV established, brisk blood return noted. Pt medicated per MAR. Inflectra infused over 1 hour. Pt tolerated infusion without s/s adverse reaction. PIV dc'd catheter tip intact, gauze and coban dressing applied. Pt discharged to self care, NAD. Pt's next appt confirmed 7/19/2022.  
The anatomic location was identified, and patient was properly positioned. Using the appropriate technique, joint reduction was performed.

## 2022-07-19 ENCOUNTER — OUTPATIENT INFUSION SERVICES (OUTPATIENT)
Dept: ONCOLOGY | Facility: MEDICAL CENTER | Age: 77
End: 2022-07-19
Attending: INTERNAL MEDICINE
Payer: MEDICARE

## 2022-07-19 VITALS
HEIGHT: 70 IN | BODY MASS INDEX: 37.02 KG/M2 | RESPIRATION RATE: 18 BRPM | OXYGEN SATURATION: 98 % | TEMPERATURE: 97.4 F | DIASTOLIC BLOOD PRESSURE: 73 MMHG | HEART RATE: 98 BPM | SYSTOLIC BLOOD PRESSURE: 134 MMHG | WEIGHT: 258.6 LBS

## 2022-07-19 DIAGNOSIS — K51.90 ULCERATIVE COLITIS WITHOUT COMPLICATIONS, UNSPECIFIED LOCATION (HCC): ICD-10-CM

## 2022-07-19 PROCEDURE — 700105 HCHG RX REV CODE 258: Performed by: INTERNAL MEDICINE

## 2022-07-19 PROCEDURE — 96413 CHEMO IV INFUSION 1 HR: CPT

## 2022-07-19 PROCEDURE — 700111 HCHG RX REV CODE 636 W/ 250 OVERRIDE (IP): Performed by: INTERNAL MEDICINE

## 2022-07-19 RX ORDER — 0.9 % SODIUM CHLORIDE 0.9 %
VIAL (ML) INJECTION PRN
Status: CANCELLED | OUTPATIENT
Start: 2022-09-13

## 2022-07-19 RX ORDER — 0.9 % SODIUM CHLORIDE 0.9 %
3 VIAL (ML) INJECTION PRN
Status: CANCELLED | OUTPATIENT
Start: 2022-09-13

## 2022-07-19 RX ORDER — 0.9 % SODIUM CHLORIDE 0.9 %
10 VIAL (ML) INJECTION PRN
Status: CANCELLED | OUTPATIENT
Start: 2022-09-13

## 2022-07-19 RX ORDER — HEPARIN SODIUM (PORCINE) LOCK FLUSH IV SOLN 100 UNIT/ML 100 UNIT/ML
500 SOLUTION INTRAVENOUS PRN
Status: CANCELLED | OUTPATIENT
Start: 2022-09-13

## 2022-07-19 RX ORDER — SODIUM CHLORIDE 9 MG/ML
INJECTION, SOLUTION INTRAVENOUS CONTINUOUS
Status: CANCELLED | OUTPATIENT
Start: 2022-09-13

## 2022-07-19 RX ADMIN — SODIUM CHLORIDE 600 MG: 9 INJECTION, SOLUTION INTRAVENOUS at 08:43

## 2022-07-19 ASSESSMENT — FIBROSIS 4 INDEX: FIB4 SCORE: 1.63

## 2022-07-19 NOTE — PROGRESS NOTES
[facility]ulatory to hospitals for Inflectra infusion.  Pt denies any sx of infection. Pt w/ no complaints at this time.  PIV established in right lateral wrist, brisk blood return noted, flushed per protocol.  Inflectra infused through PIV w/ filter in place over 1 hour, w/ no adverse reactions.  PIV flushed and removed, gauze and coban dressing applied.  Pt left on foot in NAD.  Scheduling e-mailed for pt's next appt in 8 weeks, pt has MyChart.

## 2022-09-13 ENCOUNTER — OUTPATIENT INFUSION SERVICES (OUTPATIENT)
Dept: ONCOLOGY | Facility: MEDICAL CENTER | Age: 77
End: 2022-09-13
Attending: INTERNAL MEDICINE
Payer: MEDICARE

## 2022-09-13 VITALS
SYSTOLIC BLOOD PRESSURE: 163 MMHG | RESPIRATION RATE: 18 BRPM | WEIGHT: 254.19 LBS | HEART RATE: 66 BPM | OXYGEN SATURATION: 98 % | TEMPERATURE: 97.6 F | BODY MASS INDEX: 36.39 KG/M2 | HEIGHT: 70 IN | DIASTOLIC BLOOD PRESSURE: 83 MMHG

## 2022-09-13 DIAGNOSIS — K51.90 ULCERATIVE COLITIS WITHOUT COMPLICATIONS, UNSPECIFIED LOCATION (HCC): ICD-10-CM

## 2022-09-13 PROCEDURE — 700111 HCHG RX REV CODE 636 W/ 250 OVERRIDE (IP): Performed by: INTERNAL MEDICINE

## 2022-09-13 PROCEDURE — 700105 HCHG RX REV CODE 258: Performed by: INTERNAL MEDICINE

## 2022-09-13 PROCEDURE — 96413 CHEMO IV INFUSION 1 HR: CPT

## 2022-09-13 PROCEDURE — 96365 THER/PROPH/DIAG IV INF INIT: CPT

## 2022-09-13 RX ORDER — 0.9 % SODIUM CHLORIDE 0.9 %
10 VIAL (ML) INJECTION PRN
Status: CANCELLED | OUTPATIENT
Start: 2022-11-08

## 2022-09-13 RX ORDER — SODIUM CHLORIDE 9 MG/ML
INJECTION, SOLUTION INTRAVENOUS CONTINUOUS
Status: CANCELLED | OUTPATIENT
Start: 2022-11-08

## 2022-09-13 RX ORDER — 0.9 % SODIUM CHLORIDE 0.9 %
VIAL (ML) INJECTION PRN
Status: CANCELLED | OUTPATIENT
Start: 2022-11-08

## 2022-09-13 RX ORDER — HEPARIN SODIUM (PORCINE) LOCK FLUSH IV SOLN 100 UNIT/ML 100 UNIT/ML
500 SOLUTION INTRAVENOUS PRN
Status: CANCELLED | OUTPATIENT
Start: 2022-11-08

## 2022-09-13 RX ORDER — 0.9 % SODIUM CHLORIDE 0.9 %
3 VIAL (ML) INJECTION PRN
Status: CANCELLED | OUTPATIENT
Start: 2022-11-08

## 2022-09-13 RX ADMIN — SODIUM CHLORIDE 600 MG: 9 INJECTION, SOLUTION INTRAVENOUS at 08:40

## 2022-09-13 ASSESSMENT — FIBROSIS 4 INDEX: FIB4 SCORE: 1.63

## 2022-09-13 NOTE — PROGRESS NOTES
Pt arrived to [facility] for Inflectra infusion. Denies any recent illness or fevers. States he has not had a flare up and he feels that the Inflectra is helping. PIV started in L hand, brisk blood return noted. No labs or pre meds ordered. Medication infused over 1 hour without complications. PIV removed and intact. Confirmed next appt and pt left in stable condition

## 2022-10-04 ENCOUNTER — HOSPITAL ENCOUNTER (OUTPATIENT)
Dept: LAB | Facility: MEDICAL CENTER | Age: 77
End: 2022-10-04
Attending: INTERNAL MEDICINE
Payer: MEDICARE

## 2022-10-04 LAB
ALBUMIN SERPL BCP-MCNC: 4.1 G/DL (ref 3.2–4.9)
ALBUMIN/GLOB SERPL: 1.5 G/DL
ALP SERPL-CCNC: 67 U/L (ref 30–99)
ALT SERPL-CCNC: 16 U/L (ref 2–50)
ANION GAP SERPL CALC-SCNC: 11 MMOL/L (ref 7–16)
AST SERPL-CCNC: 13 U/L (ref 12–45)
BASOPHILS # BLD AUTO: 0.3 % (ref 0–1.8)
BASOPHILS # BLD: 0.02 K/UL (ref 0–0.12)
BILIRUB SERPL-MCNC: 0.4 MG/DL (ref 0.1–1.5)
BUN SERPL-MCNC: 18 MG/DL (ref 8–22)
CALCIUM SERPL-MCNC: 9.5 MG/DL (ref 8.5–10.5)
CHLORIDE SERPL-SCNC: 107 MMOL/L (ref 96–112)
CO2 SERPL-SCNC: 25 MMOL/L (ref 20–33)
CREAT SERPL-MCNC: 0.82 MG/DL (ref 0.5–1.4)
CRP SERPL HS-MCNC: <0.3 MG/DL (ref 0–0.75)
EOSINOPHIL # BLD AUTO: 0.12 K/UL (ref 0–0.51)
EOSINOPHIL NFR BLD: 2 % (ref 0–6.9)
ERYTHROCYTE [DISTWIDTH] IN BLOOD BY AUTOMATED COUNT: 50 FL (ref 35.9–50)
GFR SERPLBLD CREATININE-BSD FMLA CKD-EPI: 91 ML/MIN/1.73 M 2
GLOBULIN SER CALC-MCNC: 2.7 G/DL (ref 1.9–3.5)
GLUCOSE SERPL-MCNC: 97 MG/DL (ref 65–99)
HCT VFR BLD AUTO: 41.1 % (ref 42–52)
HGB BLD-MCNC: 15.5 G/DL (ref 14–18)
IMM GRANULOCYTES # BLD AUTO: 0.01 K/UL (ref 0–0.11)
IMM GRANULOCYTES NFR BLD AUTO: 0.2 % (ref 0–0.9)
LYMPHOCYTES # BLD AUTO: 2.36 K/UL (ref 1–4.8)
LYMPHOCYTES NFR BLD: 39.1 % (ref 22–41)
MCH RBC QN AUTO: 32.4 PG (ref 27–33)
MCHC RBC AUTO-ENTMCNC: 33.2 G/DL (ref 33.7–35.3)
MCV RBC AUTO: 101.7 FL (ref 81.4–97.8)
MONOCYTES # BLD AUTO: 0.56 K/UL (ref 0–0.85)
MONOCYTES NFR BLD AUTO: 9.3 % (ref 0–13.4)
NEUTROPHILS # BLD AUTO: 2.97 K/UL (ref 1.82–7.42)
NEUTROPHILS NFR BLD: 49.1 % (ref 44–72)
NRBC # BLD AUTO: 0 K/UL
NRBC BLD-RTO: 0 /100 WBC
PLATELET # BLD AUTO: 170 K/UL (ref 164–446)
PMV BLD AUTO: 9.8 FL (ref 9–12.9)
POTASSIUM SERPL-SCNC: 4.9 MMOL/L (ref 3.6–5.5)
PROT SERPL-MCNC: 6.8 G/DL (ref 6–8.2)
RBC # BLD AUTO: 4.04 M/UL (ref 4.7–6.1)
SODIUM SERPL-SCNC: 143 MMOL/L (ref 135–145)
WBC # BLD AUTO: 6 K/UL (ref 4.8–10.8)

## 2022-10-04 PROCEDURE — 85025 COMPLETE CBC W/AUTO DIFF WBC: CPT

## 2022-10-04 PROCEDURE — 80053 COMPREHEN METABOLIC PANEL: CPT

## 2022-10-04 PROCEDURE — 86140 C-REACTIVE PROTEIN: CPT

## 2022-10-04 PROCEDURE — 36415 COLL VENOUS BLD VENIPUNCTURE: CPT

## 2022-11-21 ENCOUNTER — DOCUMENTATION (OUTPATIENT)
Dept: HEALTH INFORMATION MANAGEMENT | Facility: OTHER | Age: 77
End: 2022-11-21
Payer: MEDICARE

## 2023-08-29 ENCOUNTER — APPOINTMENT (OUTPATIENT)
Dept: LAB | Facility: MEDICAL CENTER | Age: 78
End: 2023-08-29

## 2024-01-18 ENCOUNTER — TELEPHONE (OUTPATIENT)
Dept: HEALTH INFORMATION MANAGEMENT | Facility: OTHER | Age: 79
End: 2024-01-18

## 2024-03-31 ENCOUNTER — HOSPITAL ENCOUNTER (OUTPATIENT)
Dept: RADIOLOGY | Facility: MEDICAL CENTER | Age: 79
End: 2024-03-31
Attending: RADIOLOGY
Payer: MEDICARE

## 2024-03-31 DIAGNOSIS — N40.1 BPH WITH URINARY OBSTRUCTION: ICD-10-CM

## 2024-03-31 DIAGNOSIS — R35.1 NOCTURIA: ICD-10-CM

## 2024-03-31 DIAGNOSIS — Z01.812 PRE-OPERATIVE LABORATORY EXAMINATION: ICD-10-CM

## 2024-03-31 DIAGNOSIS — N13.8 BPH WITH URINARY OBSTRUCTION: ICD-10-CM

## 2024-03-31 PROCEDURE — A9579 GAD-BASE MR CONTRAST NOS,1ML: HCPCS

## 2024-03-31 PROCEDURE — 72197 MRI PELVIS W/O & W/DYE: CPT

## 2024-03-31 PROCEDURE — 700117 HCHG RX CONTRAST REV CODE 255

## 2024-03-31 PROCEDURE — 700111 HCHG RX REV CODE 636 W/ 250 OVERRIDE (IP): Mod: JZ | Performed by: RADIOLOGY

## 2024-03-31 RX ADMIN — GADOTERIDOL 20 ML: 279.3 INJECTION, SOLUTION INTRAVENOUS at 13:30

## 2024-03-31 RX ADMIN — GLUCAGON 1 MG: 1 INJECTION, POWDER, LYOPHILIZED, FOR SOLUTION INTRAMUSCULAR; INTRAVENOUS at 13:00

## 2024-06-15 ENCOUNTER — HOSPITAL ENCOUNTER (EMERGENCY)
Facility: MEDICAL CENTER | Age: 79
End: 2024-06-15
Attending: EMERGENCY MEDICINE
Payer: MEDICARE

## 2024-06-15 VITALS
BODY MASS INDEX: 35.74 KG/M2 | HEIGHT: 72 IN | WEIGHT: 263.89 LBS | OXYGEN SATURATION: 93 % | RESPIRATION RATE: 18 BRPM | SYSTOLIC BLOOD PRESSURE: 154 MMHG | HEART RATE: 73 BPM | DIASTOLIC BLOOD PRESSURE: 78 MMHG | TEMPERATURE: 97.2 F

## 2024-06-15 DIAGNOSIS — R04.0 EPISTAXIS: ICD-10-CM

## 2024-06-15 PROCEDURE — 700102 HCHG RX REV CODE 250 W/ 637 OVERRIDE(OP): Performed by: EMERGENCY MEDICINE

## 2024-06-15 PROCEDURE — 99284 EMERGENCY DEPT VISIT MOD MDM: CPT

## 2024-06-15 PROCEDURE — 303620 HCHG EPISTAXIS CONTROL

## 2024-06-15 PROCEDURE — A9270 NON-COVERED ITEM OR SERVICE: HCPCS | Performed by: EMERGENCY MEDICINE

## 2024-06-15 RX ORDER — AMOXICILLIN AND CLAVULANATE POTASSIUM 875; 125 MG/1; MG/1
1 TABLET, FILM COATED ORAL 2 TIMES DAILY
Qty: 10 TABLET | Refills: 0 | Status: ACTIVE | OUTPATIENT
Start: 2024-06-15 | End: 2024-06-20

## 2024-06-15 RX ORDER — AMOXICILLIN AND CLAVULANATE POTASSIUM 875; 125 MG/1; MG/1
1 TABLET, FILM COATED ORAL ONCE
Status: COMPLETED | OUTPATIENT
Start: 2024-06-15 | End: 2024-06-15

## 2024-06-15 RX ORDER — OXYCODONE HYDROCHLORIDE 5 MG/1
5 TABLET ORAL EVERY 4 HOURS PRN
Qty: 15 TABLET | Refills: 0 | Status: SHIPPED | OUTPATIENT
Start: 2024-06-15 | End: 2024-06-18

## 2024-06-15 RX ORDER — OXYCODONE HYDROCHLORIDE 5 MG/1
5 TABLET ORAL ONCE
Status: COMPLETED | OUTPATIENT
Start: 2024-06-15 | End: 2024-06-15

## 2024-06-15 RX ORDER — OXYMETAZOLINE HYDROCHLORIDE 0.05 G/100ML
2 SPRAY NASAL ONCE
Status: COMPLETED | OUTPATIENT
Start: 2024-06-15 | End: 2024-06-15

## 2024-06-15 RX ADMIN — AMOXICILLIN AND CLAVULANATE POTASSIUM 1 TABLET: 875; 125 TABLET, FILM COATED ORAL at 12:06

## 2024-06-15 RX ADMIN — OXYMETAZOLINE HCL 2 SPRAY: 0.05 SPRAY NASAL at 11:45

## 2024-06-15 RX ADMIN — OXYCODONE HYDROCHLORIDE 5 MG: 5 TABLET ORAL at 12:06

## 2024-06-15 ASSESSMENT — FIBROSIS 4 INDEX: FIB4 SCORE: 1.47

## 2024-06-15 NOTE — ED NOTES
Ok for Dc. Instructions and prescriptions reviewed with pt. Aware  of need to  same at [facility] on [address],  f/u with ent on Monday,return for worsening s/s

## 2024-06-15 NOTE — ED NOTES
Pt pharmacy updated in [facility] at this time. Nasal packing to left nare by erp

## 2024-06-15 NOTE — ED PROVIDER NOTES
ED Provider Note    CHIEF COMPLAINT  No chief complaint on file.      EXTERNAL RECORDS REVIEWED  Outpatient Notes most recent outpatient note 3/25/2019 unrelated to today's epistaxis    HPI/ROS  LIMITATION TO HISTORY   Select: : None  OUTSIDE HISTORIAN(S):  Family: wife at bedside    DAROLD Duane MEHLHAFF is a 79 y.o. male who presents to the emergency department for left-sided nystagmus.  Bleeding started spontaneously last evening around 10 PM.  Unable to get hemorrhage control since last evening.  Denies blood thinners.  Does have a history of prior epistaxis which was believed to be secondary to dry nasal mucosa.    PAST MEDICAL HISTORY   has a past medical history of Arthritis (09/14/2017), Diabetes (HCC), High cholesterol, Hypertension, Pain, Renal disorder, Sleep apnea, and Snoring.    SURGICAL HISTORY   has a past surgical history that includes eye laceration repair (11/30/2011); lacrimal duct probe (11/30/2011); colonoscopy; umbilical hernia repair; and knee arthroplasty total (Right, 9/28/2017).    FAMILY HISTORY  History reviewed. No pertinent family history.    SOCIAL HISTORY  Social History     Tobacco Use    Smoking status: Never    Smokeless tobacco: Never   Substance and Sexual Activity    Alcohol use: Yes     Comment: 5 per week    Drug use: No    Sexual activity: Not on file       CURRENT MEDICATIONS  Home Medications       Reviewed by Devi Peck R.N. (Registered Nurse) on 06/15/24 at 1209  Med List Status: Partial     Medication Last Dose Status   allopurinol (ZYLOPRIM) 300 MG Tab  Active   Cholecalciferol (VITAMIN D PO)  Active   Cyanocobalamin (VITAMIN B 12 PO)  Active   meloxicam (MOBIC) 15 MG tablet  Active   Mesalamine 0.375 GM CAPSULE SR 24 HR  Active   metformin (GLUCOPHAGE) 500 MG Tab  Active   naproxen (ANAPROX) 220 MG tablet  Active   tamsulosin (FLOMAX) 0.4 MG capsule  Active                    ALLERGIES  No Known Allergies    PHYSICAL EXAM  VITAL SIGNS: BP (!) 145/78   Pulse 88    Temp 36.7 °C (98 °F) (Temporal)   Resp 20   Ht 1.829 m (6')   Wt 120 kg (263 lb 14.3 oz)   SpO2 94%   BMI 35.79 kg/m²      Pulse ox interpretation: I interpret this pulse ox as normal.  Constitutional: Alert in no apparent distress.  HENT: No signs of trauma, Bilateral external ears normal, Nose: Active bleeding from left nares.  Unable to visualize bleeding source.  Eyes: Pupils are equal and reactive  Cardiovascular: Regular rate and rhythm, no murmurs.   Thorax & Lungs: Normal breath sounds, No respiratory distress, No wheezing, No chest tenderness.   Abdomen: Bowel sounds normal, Soft, No tenderness  Skin: Warm, Dry, No erythema, No rash.   Musculoskeletal: Good range of motion in all major joints. No tenderness to palpation or major deformities noted.   Neurologic: Alert , Normal motor function, Normal sensory function, No focal deficits noted.   Psychiatric: Affect normal, Judgment normal, Mood normal.           RADIOLOGY/PROCEDURES       Procedure note: Epistaxis care: Initial visualization with ENT kit.  Nasal suction and also Yankauer suction were utilized for blood removal from the left nares.  Again definitive bleeding site not visualized.  Not amenable to silver nitrate.  I have placed ox metolazone within the nares as well as on the rapid Rhino prior to insertion.  Nasal packing was then placed and inflated to maximal tolerated (dis)comfort.    On repeat evaluations bleeding has stopped.  Patient tolerating nasal packing.    COURSE & MEDICAL DECISION MAKING    ASSESSMENT, COURSE AND PLAN  Care Narrative: Patient presenting with left-sided epistaxis.  No blood thinners.  Please see procedure note as above    DISPOSITION AND DISCUSSIONS  I have discussed management of the patient with the following physicians and LITO's: None    Discussion of management with other QHP or appropriate source(s): Pharmacy for medication verification      Escalation of care considered, and ultimately not  performed:Laboratory analysis and acute inpatient care management, however at this time, the patient is most appropriate for outpatient management    Barriers to care at this time, including but not limited to:  None .     Decision tools and prescription drugs considered including, but not limited to: Augmentin started due to nasal packing.    79-year-old male presenting with above presentation.  Nasal packing we ultimately required for epistaxis control.  Patient will be discharged with antibiotic as stated above.  Additionally will be provided with narcotic pain medication to be used for breakthrough pain management.  Will be referred to ENT for outpatient repeat evaluation and packing removal.    In prescribing controlled substances to this patient, I certify that I have obtained and reviewed the medical history of DAROLD Duane MEHLHAFF. I have also made a good lou effort to obtain applicable records from other providers who have treated the patient and records did not demonstrate any increased risk of substance abuse that would prevent me from prescribing controlled substances.     I have conducted a physical exam and documented it. I have reviewed Mr. LUNA’s prescription history as maintained by the [place] Prescription Monitoring Program.     I have assessed the patient’s risk for abuse, dependency, and addiction using the validated Opioid Risk Tool available at https://www.mdcalc.com/hxqbsx-rigj-deku-ort-narcotic-abuse.     Given the above, I believe the benefits of controlled substance therapy outweigh the risks. The reasons for prescribing controlled substances include non-narcotic, oral analgesic alternatives have been inadequate for pain control. Accordingly, I have discussed the risk and benefits, treatment plan, and alternative therapies with the patient.     FINAL DIAGNOSIS  1. Epistaxis           Electronically signed by: Jez Easley M.D., 6/15/2024 11:43 AM

## 2024-06-15 NOTE — ED NOTES
Pt states his nose started bleeding spontaneously since last night around 22:00. Pt states he has observed blood oozing from his left eye as well. Pts left eye appears red and currently has blood in it.

## 2024-06-17 ENCOUNTER — HOSPITAL ENCOUNTER (EMERGENCY)
Facility: MEDICAL CENTER | Age: 79
End: 2024-06-17
Attending: EMERGENCY MEDICINE
Payer: MEDICARE

## 2024-06-17 VITALS
HEIGHT: 72 IN | WEIGHT: 264.55 LBS | SYSTOLIC BLOOD PRESSURE: 129 MMHG | TEMPERATURE: 96.8 F | RESPIRATION RATE: 18 BRPM | OXYGEN SATURATION: 94 % | HEART RATE: 90 BPM | DIASTOLIC BLOOD PRESSURE: 84 MMHG | BODY MASS INDEX: 35.83 KG/M2

## 2024-06-17 DIAGNOSIS — R04.0 EPISTAXIS: ICD-10-CM

## 2024-06-17 DIAGNOSIS — Z48.00 ENCOUNTER FOR REMOVAL OF NASAL PACKING: ICD-10-CM

## 2024-06-17 PROCEDURE — 99284 EMERGENCY DEPT VISIT MOD MDM: CPT

## 2024-06-17 PROCEDURE — 303620 HCHG EPISTAXIS CONTROL

## 2024-06-17 ASSESSMENT — FIBROSIS 4 INDEX: FIB4 SCORE: 1.47

## 2024-06-17 NOTE — ED TRIAGE NOTES
Pt comes in by himself   here for nasal packing removal that was placed this past Saturday  for nose bleeding  not bleeding since and is unable to get into see ENT for removal

## 2024-06-22 ENCOUNTER — HOSPITAL ENCOUNTER (EMERGENCY)
Facility: MEDICAL CENTER | Age: 79
End: 2024-06-22
Attending: EMERGENCY MEDICINE
Payer: MEDICARE

## 2024-06-22 VITALS
OXYGEN SATURATION: 94 % | SYSTOLIC BLOOD PRESSURE: 147 MMHG | WEIGHT: 263.45 LBS | HEIGHT: 72 IN | HEART RATE: 96 BPM | BODY MASS INDEX: 35.68 KG/M2 | DIASTOLIC BLOOD PRESSURE: 89 MMHG | RESPIRATION RATE: 18 BRPM | TEMPERATURE: 99.2 F

## 2024-06-22 VITALS
TEMPERATURE: 96.8 F | SYSTOLIC BLOOD PRESSURE: 136 MMHG | HEIGHT: 72 IN | RESPIRATION RATE: 18 BRPM | HEART RATE: 71 BPM | OXYGEN SATURATION: 93 % | BODY MASS INDEX: 35.21 KG/M2 | DIASTOLIC BLOOD PRESSURE: 70 MMHG | WEIGHT: 260 LBS

## 2024-06-22 DIAGNOSIS — R04.0 EPISTAXIS: ICD-10-CM

## 2024-06-22 LAB
APTT PPP: 27.9 SEC (ref 24.7–36)
BASOPHILS # BLD AUTO: 0.4 % (ref 0–1.8)
BASOPHILS # BLD: 0.03 K/UL (ref 0–0.12)
EOSINOPHIL # BLD AUTO: 0.09 K/UL (ref 0–0.51)
EOSINOPHIL NFR BLD: 1.3 % (ref 0–6.9)
ERYTHROCYTE [DISTWIDTH] IN BLOOD BY AUTOMATED COUNT: 47.7 FL (ref 35.9–50)
HCT VFR BLD AUTO: 37.3 % (ref 42–52)
HGB BLD-MCNC: 12.7 G/DL (ref 14–18)
IMM GRANULOCYTES # BLD AUTO: 0.02 K/UL (ref 0–0.11)
IMM GRANULOCYTES NFR BLD AUTO: 0.3 % (ref 0–0.9)
INR PPP: 1.03 (ref 0.87–1.13)
LYMPHOCYTES # BLD AUTO: 1.84 K/UL (ref 1–4.8)
LYMPHOCYTES NFR BLD: 27.4 % (ref 22–41)
MCH RBC QN AUTO: 32.1 PG (ref 27–33)
MCHC RBC AUTO-ENTMCNC: 34 G/DL (ref 32.3–36.5)
MCV RBC AUTO: 94.2 FL (ref 81.4–97.8)
MONOCYTES # BLD AUTO: 0.51 K/UL (ref 0–0.85)
MONOCYTES NFR BLD AUTO: 7.6 % (ref 0–13.4)
NEUTROPHILS # BLD AUTO: 4.23 K/UL (ref 1.82–7.42)
NEUTROPHILS NFR BLD: 63 % (ref 44–72)
NRBC # BLD AUTO: 0 K/UL
NRBC BLD-RTO: 0 /100 WBC (ref 0–0.2)
PLATELET # BLD AUTO: 193 K/UL (ref 164–446)
PMV BLD AUTO: 9.4 FL (ref 9–12.9)
PROTHROMBIN TIME: 14 SEC (ref 12–14.6)
RBC # BLD AUTO: 3.96 M/UL (ref 4.7–6.1)
WBC # BLD AUTO: 6.7 K/UL (ref 4.8–10.8)

## 2024-06-22 PROCEDURE — A9270 NON-COVERED ITEM OR SERVICE: HCPCS | Performed by: STUDENT IN AN ORGANIZED HEALTH CARE EDUCATION/TRAINING PROGRAM

## 2024-06-22 PROCEDURE — 85025 COMPLETE CBC W/AUTO DIFF WBC: CPT

## 2024-06-22 PROCEDURE — 99284 EMERGENCY DEPT VISIT MOD MDM: CPT

## 2024-06-22 PROCEDURE — 700102 HCHG RX REV CODE 250 W/ 637 OVERRIDE(OP): Performed by: EMERGENCY MEDICINE

## 2024-06-22 PROCEDURE — 700101 HCHG RX REV CODE 250: Performed by: EMERGENCY MEDICINE

## 2024-06-22 PROCEDURE — 700102 HCHG RX REV CODE 250 W/ 637 OVERRIDE(OP): Performed by: STUDENT IN AN ORGANIZED HEALTH CARE EDUCATION/TRAINING PROGRAM

## 2024-06-22 PROCEDURE — A9270 NON-COVERED ITEM OR SERVICE: HCPCS | Performed by: EMERGENCY MEDICINE

## 2024-06-22 PROCEDURE — 99283 EMERGENCY DEPT VISIT LOW MDM: CPT

## 2024-06-22 PROCEDURE — 85730 THROMBOPLASTIN TIME PARTIAL: CPT

## 2024-06-22 PROCEDURE — 85610 PROTHROMBIN TIME: CPT

## 2024-06-22 PROCEDURE — 36415 COLL VENOUS BLD VENIPUNCTURE: CPT

## 2024-06-22 RX ORDER — OXYMETAZOLINE HYDROCHLORIDE 0.05 G/100ML
2 SPRAY NASAL ONCE
Status: COMPLETED | OUTPATIENT
Start: 2024-06-22 | End: 2024-06-22

## 2024-06-22 RX ORDER — TRANEXAMIC ACID 100 MG/ML
3 INJECTION, SOLUTION INTRAVENOUS ONCE
Status: DISCONTINUED | OUTPATIENT
Start: 2024-06-22 | End: 2024-06-23 | Stop reason: HOSPADM

## 2024-06-22 RX ORDER — CEPHALEXIN 500 MG/1
500 CAPSULE ORAL 2 TIMES DAILY
Qty: 10 CAPSULE | Refills: 0 | Status: ACTIVE | OUTPATIENT
Start: 2024-06-22 | End: 2024-06-27

## 2024-06-22 RX ORDER — TRANEXAMIC ACID 100 MG/ML
3 INJECTION, SOLUTION INTRAVENOUS ONCE
Status: COMPLETED | OUTPATIENT
Start: 2024-06-22 | End: 2024-06-22

## 2024-06-22 RX ORDER — IBUPROFEN 600 MG/1
600 TABLET ORAL ONCE
Status: COMPLETED | OUTPATIENT
Start: 2024-06-22 | End: 2024-06-22

## 2024-06-22 RX ORDER — ACETAMINOPHEN 500 MG
1000 TABLET ORAL EVERY 8 HOURS PRN
Qty: 21 TABLET | Refills: 0 | Status: SHIPPED | OUTPATIENT
Start: 2024-06-22 | End: 2024-06-29

## 2024-06-22 RX ORDER — ACETAMINOPHEN 500 MG
1000 TABLET ORAL ONCE
Status: COMPLETED | OUTPATIENT
Start: 2024-06-22 | End: 2024-06-22

## 2024-06-22 RX ORDER — LIDOCAINE HYDROCHLORIDE AND EPINEPHRINE 10; 10 MG/ML; UG/ML
10 INJECTION, SOLUTION INFILTRATION; PERINEURAL ONCE
Status: COMPLETED | OUTPATIENT
Start: 2024-06-22 | End: 2024-06-22

## 2024-06-22 RX ADMIN — IBUPROFEN 600 MG: 600 TABLET, FILM COATED ORAL at 21:23

## 2024-06-22 RX ADMIN — LIDOCAINE HYDROCHLORIDE AND EPINEPHRINE 10 ML: 10; 10 INJECTION, SOLUTION INFILTRATION; PERINEURAL at 18:15

## 2024-06-22 RX ADMIN — ACETAMINOPHEN 1000 MG: 500 TABLET, FILM COATED ORAL at 21:23

## 2024-06-22 RX ADMIN — SILVER NITRATE 1 APPLICATOR: 38.21; 12.74 STICK TOPICAL at 18:15

## 2024-06-22 RX ADMIN — TRANEXAMIC ACID 300 MG: 100 INJECTION, SOLUTION INTRAVENOUS at 18:15

## 2024-06-22 RX ADMIN — OXYMETAZOLINE HCL 2 SPRAY: 0.05 SPRAY NASAL at 18:15

## 2024-06-22 ASSESSMENT — FIBROSIS 4 INDEX
FIB4 SCORE: 1.3
FIB4 SCORE: 1.47

## 2024-06-23 NOTE — ED NOTES
Called [facility] charge desk for report. Charge nurse is aware of patient for transfer and ready to accept patient at this time.

## 2024-06-23 NOTE — ED TRIAGE NOTES
DAROLD Duane MEHLHAFF  79 y.o.  Chief Complaint   Patient presents with    Epistaxis     Pt BIB daughter.  Pt has had a bloody nose since 0800.  Pt has a clamp in place.  EMS was called to evaluate pt and daughter chose to bring him to the ER.

## 2024-06-23 NOTE — ED PROVIDER NOTES
ED Provider Note    CHIEF COMPLAINT  Chief Complaint   Patient presents with    Epistaxis       EXTERNAL RECORDS REVIEWED  Reviewed Dr. Easley note for left nare packing on 6/15.    HPI/ROS    OUTSIDE HISTORIAN(S):  Other states that the patient had profound bleeding earlier today from the left nare and bilateral eyes and out starting from the right nare.  EMS was called, came to the patient's house, was given a clamp and told to come to the hospital.     DAROLD Duane MEHLHAFF is a 79 y.o. male who presents with complaint of epistaxis that started this morning 8 AM from the left nare now coming to the right nare.  In addition is complaining of bleeding from his eyes bilaterally.  He was seen here on the 15th of this month, had visualization by emergency medicine physician, had a pneumatic tamponade device placed.  He states the bleeding stopped and the followed up 2 days later and the packing removed.  He was doing fine except for this morning send profound bleeding going down the back of his throat and vomiting up blood.  He does complain of slight lightheaded and dizziness, denies chest pain, loss of sensation or strength arms or legs, severe headache, he is not taking anticoagulation, aspirin or Plavix.  He has not had this problem in the past.  He did not call ENT for follow-up.  He is here with his daughter.    PAST MEDICAL HISTORY   has a past medical history of Arthritis (09/14/2017), Diabetes (HCC), High cholesterol, Hypertension, Pain, Renal disorder, Sleep apnea, and Snoring.    SURGICAL HISTORY   has a past surgical history that includes eye laceration repair (11/30/2011); lacrimal duct probe (11/30/2011); colonoscopy; umbilical hernia repair; and knee arthroplasty total (Right, 9/28/2017).    FAMILY HISTORY  History reviewed. No pertinent family history.    SOCIAL HISTORY  Social History     Tobacco Use    Smoking status: Never    Smokeless tobacco: Never   Vaping Use    Vaping status: Never Used    Substance and Sexual Activity    Alcohol use: Yes     Comment: 5 per week    Drug use: No    Sexual activity: Not on file       CURRENT MEDICATIONS  Home Medications    **Home medications have not yet been reviewed for this encounter**         ALLERGIES  No Known Allergies    PHYSICAL EXAM  VITAL SIGNS: BP (!) 147/89   Pulse 96   Temp 37.3 °C (99.2 °F) (Temporal)   Resp 18   Ht 1.829 m (6')   Wt 120 kg (263 lb 7.2 oz)   SpO2 94%   BMI 35.73 kg/m²      Nursing notes and vitals reviewed.  Constitutional: Well developed, Well nourished, No acute distress, Non-toxic appearance.   Eyes: PERRLA, EOMI, blood draining from bilateral lower eyelids   HENT: Epistaxis from bilateral nares left greater than right,  Cardiovascular: Normal heart rate, Normal rhythm, No murmurs, No rubs, No gallops.   Skin: Warm, Dry, No erythema, No rash.   Extremities: No deformity, no edema, good range of motion range of motion upper lower extremes bilaterally  Neurologic: Alert & oriented x 3, no focal abnormalities noted, acting appropriately on examination  Psychiatric: Affect normal for clinical presentation.      EKG/LABS  Results for orders placed or performed during the hospital encounter of 06/22/24   CBC WITH DIFFERENTIAL   Result Value Ref Range    WBC 6.7 4.8 - 10.8 K/uL    RBC 3.96 (L) 4.70 - 6.10 M/uL    Hemoglobin 12.7 (L) 14.0 - 18.0 g/dL    Hematocrit 37.3 (L) 42.0 - 52.0 %    MCV 94.2 81.4 - 97.8 fL    MCH 32.1 27.0 - 33.0 pg    MCHC 34.0 32.3 - 36.5 g/dL    RDW 47.7 35.9 - 50.0 fL    Platelet Count 193 164 - 446 K/uL    MPV 9.4 9.0 - 12.9 fL    Neutrophils-Polys 63.00 44.00 - 72.00 %    Lymphocytes 27.40 22.00 - 41.00 %    Monocytes 7.60 0.00 - 13.40 %    Eosinophils 1.30 0.00 - 6.90 %    Basophils 0.40 0.00 - 1.80 %    Immature Granulocytes 0.30 0.00 - 0.90 %    Nucleated RBC 0.00 0.00 - 0.20 /100 WBC    Neutrophils (Absolute) 4.23 1.82 - 7.42 K/uL    Lymphs (Absolute) 1.84 1.00 - 4.80 K/uL    Monos (Absolute) 0.51  0.00 - 0.85 K/uL    Eos (Absolute) 0.09 0.00 - 0.51 K/uL    Baso (Absolute) 0.03 0.00 - 0.12 K/uL    Immature Granulocytes (abs) 0.02 0.00 - 0.11 K/uL    NRBC (Absolute) 0.00 K/uL   APTT   Result Value Ref Range    APTT 27.9 24.7 - 36.0 sec   PROTHROMBIN TIME   Result Value Ref Range    PT 14.0 12.0 - 14.6 sec    INR 1.03 0.87 - 1.13       I have independently interpreted this EKG    RADIOLOGY/PROCEDURES   Epistaxis control: Patient blew out clots bilateral, Afrin spray placed bilaterally, following this lidocaine with epinephrine pledget placed for 10 minutes.  The patient still had profound bleeding, following this TXA placed bilateral, continue to have profound bleeding in the posterior pharynx as well.  Following this pneumatic tamponade bilateral naris was applied with appropriate pressure.  The patient has significant bleeding control with no longer having pharyngeal hemorrhage or evidence of epistaxis posteriorly but there is oozing from the bilateral anterior aspect and slight bleeding.  Patient tolerated procedure well.      COURSE & MEDICAL DECISION MAKING    ASSESSMENT, COURSE AND PLAN  Care Narrative: This is a charming 79-year-old gentleman presents with bilateral epistaxis.  He had a first event on the 15th of this month.  Patient had a packing placed removed on the 17th and now is having profound bleeding bilaterally.  Efforts were obtained to achieve hemostasis was unavailable, unachievable.  I was unable to identify her visualize the active area of bleeding.  He had bilateral pneumatic nasal tamponade placed for occlusion, tamponade.  The patient tolerated procedure well.  In lieu of the patient having a second nosebleed, significant hemorrhage with moderate hemostasis here, I do believe patient will require ENT evaluation.  For this reason we will be transferring the patient to [facility] for further evaluation and management.  On reevaluation prior to transfer, the hemorrhage  has significant reduced, he is having no posterior hemorrhage requiring posterior packing.        DISPOSITION AND DISCUSSIONS  I have discussed management of the patient with the following physicians and LITO's: Discussed with Dr. Glass for transfer to [facility] for ENT consultation.  The patient be going by ambulance.    FINAL DIAGNOSIS  1. Epistaxis Active            Electronically signed by: Anam Bocanegra D.O., 6/22/2024 6:32 PM

## 2024-06-23 NOTE — DISCHARGE INSTRUCTIONS
We have given you a new prescription for antibiotics which you should take as directed.  We have given you a contact information and placed referral for Dr. Mcgregor an ENT doctor you will need to follow-up with.  The packing will need to be in for 4 to 5 days.  If you have uncontrolled pain, fever, vomiting or bleeding please return to the emergency department.

## 2024-06-23 NOTE — ED PROVIDER NOTES
ED Provider Note    CHIEF COMPLAINT  Chief Complaint   Patient presents with    Epistaxis     Transfer from [facility]. Pt has had a bloody nose which started around 0800 today. Pt reports that it started last week, he had it packed and it was fine until this morning.        EXTERNAL RECORDS REVIEWED  External ED Note department visit from today where patient was treated for epistaxis with bilateral nasal packing    HPI/ROS  LIMITATION TO HISTORY     OUTSIDE HISTORIAN(S):  Family wife at bedside    DAROLD Duane MEHLHAFF is a 79 y.o. male who presents with epistaxis.  1 week ago patient had onset of epistaxis went to the emergency room and had packing.  Patient went to the emergency room 2 days later because packing was uncomfortable and had it removed at which point bleeding had stopped.  Patient had recurrent bleeding today.  Patient was seen at outside hospital where bilateral Rhino Rocket's were placed.  Patient does not take blood thinners.  Patient denies recent trauma.  Patient denies fever, chills.  Patient denies bloody stool, abnormal bruising.    PAST MEDICAL HISTORY   has a past medical history of Arthritis (09/14/2017), Diabetes (HCC), High cholesterol, Hypertension, Pain, Renal disorder, Sleep apnea, and Snoring.    SURGICAL HISTORY   has a past surgical history that includes eye laceration repair (11/30/2011); lacrimal duct probe (11/30/2011); colonoscopy; umbilical hernia repair; and knee arthroplasty total (Right, 9/28/2017).    FAMILY HISTORY  History reviewed. No pertinent family history.    SOCIAL HISTORY  Social History     Tobacco Use    Smoking status: Never    Smokeless tobacco: Never   Vaping Use    Vaping status: Never Used   Substance and Sexual Activity    Alcohol use: Yes     Comment: 5 per week    Drug use: No    Sexual activity: Not on file       CURRENT MEDICATIONS  Home Medications       Reviewed by Scottie Iyer R.N. (Registered Nurse) on 06/22/24 at 1930  Med List Status:  Not Addressed     Medication Last Dose Status   allopurinol (ZYLOPRIM) 300 MG Tab  Active   Cholecalciferol (VITAMIN D PO)  Active   Cyanocobalamin (VITAMIN B 12 PO)  Active   meloxicam (MOBIC) 15 MG tablet  Active   Mesalamine 0.375 GM CAPSULE SR 24 HR  Active   metformin (GLUCOPHAGE) 500 MG Tab  Active   naproxen (ANAPROX) 220 MG tablet  Active   tamsulosin (FLOMAX) 0.4 MG capsule  Active                    ALLERGIES  No Known Allergies    PHYSICAL EXAM  VITAL SIGNS: /70   Pulse 71   Temp 36 °C (96.8 °F) (Temporal)   Resp 18   Ht 1.829 m (6')   Wt 118 kg (260 lb)   SpO2 93%   BMI 35.26 kg/m²    Constitutional: Alert in no apparent distress.  HENT: No signs of trauma, Bilateral external ears normal, bilateral balloon tamponade devices placed, no bleeding in posterior pharynx  Eyes: Pupils are equal and reactive, Conjunctiva normal, Non-icteric.   Neck: Normal range of motion, No tenderness, Supple, No stridor.   Lymphatic: No lymphadenopathy noted.   Cardiovascular: Regular rate and rhythm, no murmurs.   Thorax & Lungs: Normal breath sounds, No respiratory distress, No wheezing, No chest tenderness.   Abdomen: Bowel sounds normal, Soft, No tenderness, No masses, No pulsatile masses.   Skin: Warm, Dry, No erythema, No rash.   Back: No bony tenderness  Extremities: Intact distal pulses, No edema, No tenderness, No cyanosis  Musculoskeletal: Good range of motion in all major joints. No tenderness to palpation or major deformities noted.   Neurologic: Alert , Normal motor function, Normal sensory function, No focal deficits noted.       EKG/LABS  Reviewed CBC from outside hospital which showed normal platelets        COURSE & MEDICAL DECISION MAKING    ASSESSMENT, COURSE AND PLAN  Care Narrative: On arrival patient noted to be hypertensive and tachycardic.  At the time my evaluation blood pressure had improved and tachycardia resolved.  Patient has no signs of ongoing bleeding after placement of bilateral  Rhino Rocket.  CBC from outside hospital shows no thrombocytopenia.  Patient does not have any signs of facial trauma or infection.  Patient does appear uncomfortable with packing in place.  Spoke with Dr. Mcgregor from ENT who recommended follow-up in 4 to 5 days, continuation of antibiotics.  Dr. Mcgregor explained that keeping packing in place was the best plan of action that cauterization would be unlikely solution.  Despite acetaminophen and ibuprofen patient still with some degree of discomfort.  Removed 1 mL of air from each Rhino Rocket after which patient had improvement in pain.  Patient says that pain was tolerable and he felt comfortable with plan to follow-up with ENT in 4 to 5 days.  Patient encouraged to return to emergency ferment if he had uncontrolled pain or recurrent bleeding.  New prescription for antibiotics were placed.  During over 4 hours of evaluation in the emergency room patient had no recurrent bleeding.  New referral for ENT was also placed.                ADDITIONAL PROBLEMS MANAGED      DISPOSITION AND DISCUSSIONS  I have discussed management of the patient with the following physicians and LITO's: ENT      FINAL DIAGNOSIS  1. Epistaxis           Electronically signed by: Marcus Arias D.O., 6/22/2024 7:55 PM

## 2024-06-23 NOTE — ED TRIAGE NOTES
Chief Complaint   Patient presents with    Epistaxis     Transfer from [facility]. Pt has had a bloody nose which started around 0800 today. Pt reports that it started last week, he had it packed and it was fine until this morning.      En route pt received 100mcg of fentanyl    Vitals:    06/22/24 1929   BP: (!) 161/70   Pulse: (!) 124   Resp: 18   Temp: 36.1 °C (97 °F)   SpO2: 92%

## 2024-10-10 ENCOUNTER — PHARMACY VISIT (OUTPATIENT)
Dept: PHARMACY | Facility: MEDICAL CENTER | Age: 79
End: 2024-10-10
Payer: COMMERCIAL

## 2024-10-10 PROCEDURE — RXMED WILLOW AMBULATORY MEDICATION CHARGE: Performed by: INTERNAL MEDICINE

## 2024-10-10 RX ORDER — INFLUENZA A VIRUS A/VICTORIA/4897/2022 IVR-238 (H1N1) ANTIGEN (FORMALDEHYDE INACTIVATED), INFLUENZA A VIRUS A/CALIFORNIA/122/2022 SAN-022 (H3N2) ANTIGEN (FORMALDEHYDE INACTIVATED), AND INFLUENZA B VIRUS B/MICHIGAN/01/2021 ANTIGEN (FORMALDEHYDE INACTIVATED) 60; 60; 60 UG/.5ML; UG/.5ML; UG/.5ML
INJECTION, SUSPENSION INTRAMUSCULAR
Qty: 0.5 ML | Refills: 0 | OUTPATIENT
Start: 2024-10-10

## (undated) DEVICE — GLOVE BIOGEL INDICATOR SZ 8 SURGICAL PF LTX - (50/BX 4BX/CA)

## (undated) DEVICE — TOURNIQUET CUFF 34 X 4 ONE PORT DISP - STERILE (10/BX)

## (undated) DEVICE — SUTURE 2-0 MONOCRYL PLUS UNDYED CT-1 1 X 36 (36EA/BX)"

## (undated) DEVICE — GLOVE BIOGEL SZ 7 SURGICAL PF LTX - (50PR/BX 4BX/CA)

## (undated) DEVICE — PROTECTOR ULNA NERVE - (36PR/CA)

## (undated) DEVICE — ELECTRODE DUAL RETURN W/ CORD - (50/PK)

## (undated) DEVICE — SUTURE GENERAL

## (undated) DEVICE — STOCKINET TUBULAR 6IN STERILE - 6 X 48YDS (25/CA)

## (undated) DEVICE — GLOVE BIOGEL INDICATOR SZ 8.5 SURGICAL PF LTX - (50/BX 4BX/CA)

## (undated) DEVICE — PAD PREP 24 X 48 CUFFED - (100/CA)

## (undated) DEVICE — SODIUM CHL IRRIGATION 0.9% 1000ML (12EA/CA)

## (undated) DEVICE — CANISTER SUCTION 3000ML MECHANICAL FILTER AUTO SHUTOFF MEDI-VAC NONSTERILE LF DISP  (40EA/CA)

## (undated) DEVICE — SET EXTENSION WITH 2 PORTS (48EA/CA) ***PART #2C8610 IS A SUBSTITUTE*****

## (undated) DEVICE — BLOCK

## (undated) DEVICE — SYRINGE DISP. 60 CC LL - (30/BX, 12BX/CA)**WHEN THESE ARE GONE ORDER #500206**

## (undated) DEVICE — BLADE RECIPROCATING 12.7 X 78.7 X 1.0MM (1/EA)

## (undated) DEVICE — PACK TOTAL KNEE  (1/CA)

## (undated) DEVICE — GLOVE BIOGEL SZ 7.5 SURGICAL PF LTX - (50PR/BX 4BX/CA)

## (undated) DEVICE — MIXER BONE CEMENT REVOLUTION - W/FEMORAL PRESSURIZER (6/CA)

## (undated) DEVICE — HANDPIECE 10FT INTPLS SCT PLS IRRIGATION HAND CONTROL SET (6/PK)

## (undated) DEVICE — GLOVE BIOGEL SZ 8 SURGICAL PF LTX - (50PR/BX 4BX/CA)

## (undated) DEVICE — SODIUM CHL. IRRIGATION 0.9% 3000ML (4EA/CA 65CA/PF)

## (undated) DEVICE — GLOVE BIOGEL SZ 6.5 SURGICAL PF LTX (50PR/BX 4BX/CA)

## (undated) DEVICE — GLOVE BIOGEL INDICATOR SZ 6.5 SURGICAL PF LTX - (50PR/BX 4BX/CA)

## (undated) DEVICE — MASK ANESTHESIA ADULT  - (100/CA)

## (undated) DEVICE — GLOVE BIOGEL SZ 8.5 SURGICAL PF LTX - (50PR/BX 4BX/CA)

## (undated) DEVICE — GLOVE BIOGEL PI ORTHO SZ 7.5 PF LF (40PR/BX)

## (undated) DEVICE — SUCTION INSTRUMENT YANKAUER OPEN TIP W/O VENT (50EA/CA)

## (undated) DEVICE — SLEEVE, VASO, THIGH, MED

## (undated) DEVICE — HEAD HOLDER JUNIOR/ADULT

## (undated) DEVICE — GOWN WARMING STANDARD FLEX - (30/CA)

## (undated) DEVICE — SUTURE 1 VICRYL PLUS CTX - 8 X 18 INCH (12/BX)

## (undated) DEVICE — SENSOR SPO2 NEO LNCS ADHESIVE (20/BX) SEE USER NOTES

## (undated) DEVICE — KIT ANESTHESIA W/CIRCUIT & 3/LT BAG W/FILTER (20EA/CA)

## (undated) DEVICE — DRAPE LOWER EXTREMETY - (6/CA)

## (undated) DEVICE — ELECTRODE 850 FOAM ADHESIVE - HYDROGEL RADIOTRNSPRNT (50/PK)

## (undated) DEVICE — TIP INTPLS HFLO ML ORFC BTRY - (12/CS)  FOR SURGILAV

## (undated) DEVICE — NEPTUNE 4 PORT MANIFOLD - (20/PK)

## (undated) DEVICE — BLADE 90X12.5X1.37MM SAW SAGITTAL

## (undated) DEVICE — TUBING CLEARLINK DUO-VENT - C-FLO (48EA/CA)

## (undated) DEVICE — NEEDLE SAFETY 18 GA X 1 1/2 IN (100EA/BX)

## (undated) DEVICE — CHLORAPREP 26 ML APPLICATOR - ORANGE TINT(25/CA)

## (undated) DEVICE — SUTURE 3-0 MONOCRYL PLUS PS-1 - 27 INCH (36/BX)

## (undated) DEVICE — BLADE SAGITTAL 6 SYSTEM 25MM

## (undated) DEVICE — DERMABOND ADVANCED - (12EA/BX)

## (undated) DEVICE — LACTATED RINGERS INJ 1000 ML - (14EA/CA 60CA/PF)

## (undated) DEVICE — KIT ROOM DECONTAMINATION

## (undated) DEVICE — LENS/HOOD FOR SPACESUIT - (32/PK) PEEL AWAY FACE

## (undated) DEVICE — SUCTION INSTRUMENT YANKAUER BULBOUS TIP W/O VENT (50EA/CA)

## (undated) DEVICE — SET LEADWIRE 5 LEAD BEDSIDE DISPOSABLE ECG (1SET OF 5/EA)

## (undated) DEVICE — MASK, LARYNGEAL AIRWAY #4

## (undated) DEVICE — DRAPE LARGE 3 QUARTER - (20/CA)